# Patient Record
Sex: FEMALE | Race: WHITE | Employment: FULL TIME | URBAN - METROPOLITAN AREA
[De-identification: names, ages, dates, MRNs, and addresses within clinical notes are randomized per-mention and may not be internally consistent; named-entity substitution may affect disease eponyms.]

---

## 2019-10-09 ENCOUNTER — MED REC SCAN ONLY (OUTPATIENT)
Dept: INTERNAL MEDICINE CLINIC | Facility: CLINIC | Age: 54
End: 2019-10-09

## 2021-06-17 ENCOUNTER — OFFICE VISIT (OUTPATIENT)
Dept: INTERNAL MEDICINE CLINIC | Facility: CLINIC | Age: 56
End: 2021-06-17
Payer: COMMERCIAL

## 2021-06-17 VITALS
BODY MASS INDEX: 24.01 KG/M2 | SYSTOLIC BLOOD PRESSURE: 158 MMHG | DIASTOLIC BLOOD PRESSURE: 112 MMHG | HEART RATE: 75 BPM | OXYGEN SATURATION: 100 % | HEIGHT: 60 IN | RESPIRATION RATE: 14 BRPM | TEMPERATURE: 98 F | WEIGHT: 122.31 LBS

## 2021-06-17 DIAGNOSIS — Z00.00 ROUTINE GENERAL MEDICAL EXAMINATION AT A HEALTH CARE FACILITY: Primary | ICD-10-CM

## 2021-06-17 DIAGNOSIS — Z12.11 COLON CANCER SCREENING: ICD-10-CM

## 2021-06-17 DIAGNOSIS — Z12.31 BREAST CANCER SCREENING BY MAMMOGRAM: ICD-10-CM

## 2021-06-17 DIAGNOSIS — Z78.0 POSTMENOPAUSAL: ICD-10-CM

## 2021-06-17 DIAGNOSIS — I10 ESSENTIAL HYPERTENSION: ICD-10-CM

## 2021-06-17 DIAGNOSIS — Z12.4 CERVICAL CANCER SCREENING: ICD-10-CM

## 2021-06-17 PROCEDURE — 82270 OCCULT BLOOD FECES: CPT | Performed by: INTERNAL MEDICINE

## 2021-06-17 PROCEDURE — 99386 PREV VISIT NEW AGE 40-64: CPT | Performed by: INTERNAL MEDICINE

## 2021-06-17 PROCEDURE — 3080F DIAST BP >= 90 MM HG: CPT | Performed by: INTERNAL MEDICINE

## 2021-06-17 PROCEDURE — 3008F BODY MASS INDEX DOCD: CPT | Performed by: INTERNAL MEDICINE

## 2021-06-17 PROCEDURE — 87624 HPV HI-RISK TYP POOLED RSLT: CPT | Performed by: INTERNAL MEDICINE

## 2021-06-17 PROCEDURE — 82272 OCCULT BLD FECES 1-3 TESTS: CPT | Performed by: INTERNAL MEDICINE

## 2021-06-17 PROCEDURE — 88175 CYTOPATH C/V AUTO FLUID REDO: CPT | Performed by: INTERNAL MEDICINE

## 2021-06-17 PROCEDURE — 3077F SYST BP >= 140 MM HG: CPT | Performed by: INTERNAL MEDICINE

## 2021-06-17 RX ORDER — DILTIAZEM HYDROCHLORIDE 180 MG/1
180 CAPSULE, COATED, EXTENDED RELEASE ORAL DAILY
Qty: 30 CAPSULE | Refills: 0 | Status: SHIPPED | OUTPATIENT
Start: 2021-06-17 | End: 2021-08-05

## 2021-06-17 NOTE — PROGRESS NOTES
HPI:   Florian Paige is a 61year old female who presents for a complete physical exam. . Patient complains of long lapse in healthcare, virtually no preventive care, and recent BP elevation at dentist.   She thinks her BP is only high when she has a tough David Ott. Children: none , , .  She quit smoking this year     REVIEW OF SYSTEMS:     GENERAL denies night sweats, weight loss, rash, joint pain,   SKIN: denies any unusual skin lesions, no bothersome rashes  EYES:denies visual disturbances, or blurred vis percussion  CARDIO: RRR without murmur or gallop  GI: good BS's,no masses, HSM or tenderness  :introitus is normal,scant discharge,cervix is pink, stenotic,no adnexal masses or tenderness, no uterine enlargement or masses  MUSCULOSKELETAL: back is not te but you may have to wait. Thank you!       Meds & Refills for this Visit:  Requested Prescriptions     Signed Prescriptions Disp Refills   • dilTIAZem HCl ER Coated Beads (CARTIA XT) 180 MG Oral Capsule SR 24 Hr 30 capsule 0     Sig: Take 1 capsule (180

## 2021-06-17 NOTE — PATIENT INSTRUCTIONS
Please get your labs done. You should be fasting for at least 10 hours. You may drink water up until the time of your lab appointment.       If you take a multivitamin with Biotin or any biotin products it should be held for 3 days prior to getting your

## 2021-06-18 ENCOUNTER — LAB ENCOUNTER (OUTPATIENT)
Dept: LAB | Age: 56
End: 2021-06-18
Attending: INTERNAL MEDICINE
Payer: COMMERCIAL

## 2021-06-18 ENCOUNTER — EKG ENCOUNTER (OUTPATIENT)
Dept: LAB | Age: 56
End: 2021-06-18
Attending: INTERNAL MEDICINE
Payer: COMMERCIAL

## 2021-06-18 DIAGNOSIS — Z00.00 ROUTINE GENERAL MEDICAL EXAMINATION AT A HEALTH CARE FACILITY: ICD-10-CM

## 2021-06-18 PROCEDURE — 80053 COMPREHEN METABOLIC PANEL: CPT

## 2021-06-18 PROCEDURE — 93005 ELECTROCARDIOGRAM TRACING: CPT

## 2021-06-18 PROCEDURE — 81003 URINALYSIS AUTO W/O SCOPE: CPT

## 2021-06-18 PROCEDURE — 80061 LIPID PANEL: CPT

## 2021-06-18 PROCEDURE — 93010 ELECTROCARDIOGRAM REPORT: CPT | Performed by: INTERNAL MEDICINE

## 2021-06-18 PROCEDURE — 82746 ASSAY OF FOLIC ACID SERUM: CPT

## 2021-06-18 PROCEDURE — 36415 COLL VENOUS BLD VENIPUNCTURE: CPT

## 2021-06-18 PROCEDURE — 85025 COMPLETE CBC W/AUTO DIFF WBC: CPT

## 2021-06-18 PROCEDURE — 84443 ASSAY THYROID STIM HORMONE: CPT

## 2021-06-18 PROCEDURE — 82607 VITAMIN B-12: CPT

## 2021-06-23 ENCOUNTER — MED REC SCAN ONLY (OUTPATIENT)
Dept: INTERNAL MEDICINE CLINIC | Facility: CLINIC | Age: 56
End: 2021-06-23

## 2021-06-23 DIAGNOSIS — I10 ESSENTIAL HYPERTENSION: Primary | ICD-10-CM

## 2021-06-23 DIAGNOSIS — R94.31 ABNORMAL EKG: Primary | ICD-10-CM

## 2021-06-23 NOTE — PROGRESS NOTES
Spoke to pt. Made aware of results & recommendations. Pt voiced understanding.   Pt stated she has family history of low sodium with father and mother with liver failure  She will keep the recommendation in mind and will repeat labs in 1 month  She stated s

## 2021-06-23 NOTE — PROGRESS NOTES
LMTCB    Stress echo and CXR ordered per result note. Per [facility] practice Hard stop, Covid test was order for postprocedure.

## 2021-06-24 DIAGNOSIS — R94.31 ABNORMAL ECG: Primary | ICD-10-CM

## 2021-06-24 NOTE — PROGRESS NOTES
Spoke to pt, aware of results & recommendations. Pt refused to schedule tests at this time. Pt states, \"I know I have had a heart murmur since in my 20s. My dad has a heart murmur. I do know that I have an irregular heartbeat. \"  Pt states, \"I don't h

## 2021-07-06 ENCOUNTER — TELEPHONE (OUTPATIENT)
Dept: INTERNAL MEDICINE CLINIC | Facility: CLINIC | Age: 56
End: 2021-07-06

## 2021-07-06 NOTE — TELEPHONE ENCOUNTER
Dr Dominga Barnhart, please see note below from referral dept  Re: Stress ECHO ordered on 6/23/21 due to abnormal EKG    Please see EKG result note on 6/18/21  Pt does not want to do it  Pt has upcoming appt with you on 7/20/21  Please advise, thanks

## 2021-07-06 NOTE — TELEPHONE ENCOUNTER
Eusebio Knowles  [facility] Clinical Staff  Hello,     This referral has been Denied and will be closed as, Denied by Health Plan, pending provider decision for appeal.     Patient: Chema Cristina   Service Ref #: [id number]   Member ID: [id number]   Group #: [id number]

## 2021-07-12 ENCOUNTER — TELEPHONE (OUTPATIENT)
Dept: INTERNAL MEDICINE CLINIC | Facility: CLINIC | Age: 56
End: 2021-07-12

## 2021-07-12 NOTE — TELEPHONE ENCOUNTER
Left a message for patient to call back with an update if she would like for our office to Fax the Signed Cologuard Order from 6/17/2021 OV.   Patient was to check Coverage/Costs of Cologuard with Ins Co and let our office know to continue with order or not

## 2021-07-20 ENCOUNTER — OFFICE VISIT (OUTPATIENT)
Dept: INTERNAL MEDICINE CLINIC | Facility: CLINIC | Age: 56
End: 2021-07-20
Payer: COMMERCIAL

## 2021-07-20 VITALS
TEMPERATURE: 98 F | RESPIRATION RATE: 14 BRPM | HEART RATE: 76 BPM | SYSTOLIC BLOOD PRESSURE: 142 MMHG | HEIGHT: 60 IN | DIASTOLIC BLOOD PRESSURE: 88 MMHG | BODY MASS INDEX: 24.17 KG/M2 | WEIGHT: 123.13 LBS | OXYGEN SATURATION: 100 %

## 2021-07-20 DIAGNOSIS — R94.31 ABNORMAL ECG: ICD-10-CM

## 2021-07-20 DIAGNOSIS — Z12.11 COLON CANCER SCREENING: ICD-10-CM

## 2021-07-20 DIAGNOSIS — E87.1 HYPONATREMIA: ICD-10-CM

## 2021-07-20 DIAGNOSIS — I10 ESSENTIAL HYPERTENSION: Primary | ICD-10-CM

## 2021-07-20 DIAGNOSIS — D75.89 MACROCYTOSIS: ICD-10-CM

## 2021-07-20 PROCEDURE — 3079F DIAST BP 80-89 MM HG: CPT | Performed by: INTERNAL MEDICINE

## 2021-07-20 PROCEDURE — 99213 OFFICE O/P EST LOW 20 MIN: CPT | Performed by: INTERNAL MEDICINE

## 2021-07-20 PROCEDURE — 3077F SYST BP >= 140 MM HG: CPT | Performed by: INTERNAL MEDICINE

## 2021-07-20 PROCEDURE — 3008F BODY MASS INDEX DOCD: CPT | Performed by: INTERNAL MEDICINE

## 2021-07-20 RX ORDER — DILTIAZEM HYDROCHLORIDE 180 MG/1
180 CAPSULE, COATED, EXTENDED RELEASE ORAL DAILY
Qty: 90 CAPSULE | Refills: 1 | Status: CANCELLED | OUTPATIENT
Start: 2021-07-20

## 2021-07-20 RX ORDER — DILTIAZEM HYDROCHLORIDE 300 MG/1
300 CAPSULE, COATED, EXTENDED RELEASE ORAL DAILY
Qty: 90 CAPSULE | Refills: 1 | Status: SHIPPED | OUTPATIENT
Start: 2021-07-20 | End: 2021-08-05

## 2021-07-20 NOTE — PROGRESS NOTES
Radha Castano is a 61year old female.  To F/U from last visit regarding HTN and test results  HPI:    Interim history:she was here as new pt and found to be hypertensive  Taking medication  Checks her BP at work and normotensive, <130/80  Feels good- her s Temp 98.1 °F (36.7 °C) (Temporal)   Resp 14   Ht 5' (1.524 m)   Wt 123 lb 1.6 oz (55.8 kg)   LMP 12/31/2013   SpO2 100%   BMI 24.04 kg/m²   GENERAL: well developed, well nourished,in no apparent distress      Results for orders placed or performed in visit 24.0 06/18/2021         ASSESSMENT AND PLAN:   Essential hypertension  (primary encounter diagnosis) not well controlled , will increase dose to 300 mg, tolerating well  Macrocytosis- she is refusing further evaluation.  This is likely d/t alcohol effect, I

## 2021-08-03 ENCOUNTER — TELEPHONE (OUTPATIENT)
Dept: INTERNAL MEDICINE CLINIC | Facility: CLINIC | Age: 56
End: 2021-08-03

## 2021-08-03 DIAGNOSIS — I10 ESSENTIAL HYPERTENSION: Primary | ICD-10-CM

## 2021-08-03 NOTE — TELEPHONE ENCOUNTER
Spoke with pharmacist Yuridia  Pharmacist stated 300 mg dose is in capsule form  Called pt and left message to let nurse know how is she cutting capsule in half and it is not a safe practice    To send 180 mg dose instead if pt is cutting capsule in halfs

## 2021-08-03 NOTE — TELEPHONE ENCOUNTER
Pt called and stated that Dr Joselin Hill started Pt on dilTIAZem (CARTIA XT) 180MG. Dr Joselin Hill then changed Pt to dilTIAZem (CARTIA XT) 300 MG Oral Capsule SR 24 Hr.  Pt stated that she took the 300 for a few days and was having muscle aces and swelling in her legs

## 2021-08-04 NOTE — TELEPHONE ENCOUNTER
Left detailed message to [facility]

## 2021-08-05 RX ORDER — DILTIAZEM HYDROCHLORIDE 180 MG/1
180 CAPSULE, COATED, EXTENDED RELEASE ORAL DAILY
Qty: 90 CAPSULE | Refills: 0 | COMMUNITY
Start: 2022-01-20 | End: 2022-02-03

## 2021-08-05 RX ORDER — DILTIAZEM HYDROCHLORIDE 300 MG/1
CAPSULE, COATED, EXTENDED RELEASE ORAL
Qty: 90 CAPSULE | Refills: 0 | COMMUNITY
Start: 2021-07-20

## 2021-08-05 RX ORDER — DILTIAZEM HYDROCHLORIDE 300 MG/1
CAPSULE, COATED, EXTENDED RELEASE ORAL
Qty: 90 CAPSULE | Refills: 0 | COMMUNITY
Start: 2021-07-20 | End: 2021-08-05

## 2021-08-05 NOTE — TELEPHONE ENCOUNTER
Pt called back. States that she splits the capsules open & uses the smaller half of the casing to [facility] [place] as half\". Pt states that it was $60 for 90 capsules & that is really expensive for her.   States \"if she can split them in half & make a 6 month

## 2022-01-08 ENCOUNTER — TELEPHONE (OUTPATIENT)
Dept: INTERNAL MEDICINE CLINIC | Facility: CLINIC | Age: 57
End: 2022-01-08

## 2022-02-01 ENCOUNTER — TELEPHONE (OUTPATIENT)
Dept: INTERNAL MEDICINE CLINIC | Facility: CLINIC | Age: 57
End: 2022-02-01

## 2022-02-01 NOTE — TELEPHONE ENCOUNTER
Did the patient contact the pharmacy directly?: new pharmacy/[facility] on [address]    Is patient out of meds or supply very low?: pt has 5  Days left    Medication Requested: dilTIAZem HCl ER Coated Beads (CARTIA XT) 180 MG Oral Capsule SR 24 Hr    Dose:     Is patient requesting a 30 or 90 day supply?: 90    Pharmacy name and phone # or location:  [facility] [address]    Is the patient due for an appointment?: yes/pt is not able to afford to come in   (if so, please schedule appt)    Additional Notes: pt overdue for labs/states she can not get them done now due to cost

## 2022-02-03 RX ORDER — DILTIAZEM HYDROCHLORIDE 180 MG/1
180 CAPSULE, COATED, EXTENDED RELEASE ORAL DAILY
Qty: 30 CAPSULE | Refills: 0 | Status: SHIPPED | OUTPATIENT
Start: 2022-02-03 | End: 2022-03-05

## 2022-02-03 NOTE — TELEPHONE ENCOUNTER
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 Pt said its too expensive to come in for a BP check- does not feel its worth to come in.

## 2022-02-03 NOTE — TELEPHONE ENCOUNTER
LMOM to schedule BP and let pt know of katies note- see if pt is able Pt has appt 7/27/2017  No need for prior auth or predetermination.  May buy & bill Botox

## 2022-02-03 NOTE — TELEPHONE ENCOUNTER
I am not sure what this request means,. Is the patient stating she is not able to afford an apt at this time? Safety wise she is due for an apt and her BP and HR should be checked to confirm it is safe for her; Please have her schedule. 30 day supply sent. Thanks.

## 2022-02-07 DIAGNOSIS — I10 ESSENTIAL HYPERTENSION: ICD-10-CM

## 2022-02-08 RX ORDER — DILTIAZEM HYDROCHLORIDE 180 MG/1
180 CAPSULE, COATED, EXTENDED RELEASE ORAL DAILY
Qty: 30 CAPSULE | Refills: 0 | Status: CANCELLED | OUTPATIENT
Start: 2022-02-08 | End: 2022-03-10

## 2022-02-08 RX ORDER — DILTIAZEM HYDROCHLORIDE 300 MG/1
300 CAPSULE, COATED, EXTENDED RELEASE ORAL DAILY
Qty: 30 CAPSULE | Refills: 0 | Status: SHIPPED | OUTPATIENT
Start: 2022-02-08 | End: 2022-03-08

## 2022-02-08 RX ORDER — DILTIAZEM HYDROCHLORIDE 180 MG/1
CAPSULE, EXTENDED RELEASE ORAL
Qty: 30 CAPSULE | Refills: 0 | OUTPATIENT
Start: 2022-02-08

## 2022-02-08 NOTE — TELEPHONE ENCOUNTER
Hypertension Medications Protocol Failed 02/07/2022 02:41 PM   Protocol Details  Appointment in past 6 or next 3 months    CMP or BMP in past 12 months    Last serum creatinine< 2.0   RX has not been prescribed before   Last OV relevant to medication:7/20/2021  Last refill date: 7/20/2021 (dilTIAZEM (CARTIA XT) 300mg  #/refills: 90-0  When pt was asked to return for OV: 6 months (around 1/20/2022) for high blood pressure.   Upcoming appt/reason: n/a  Was pt informed of any over due labs:Yes  Lab Results   Component Value Date    GLU 87 06/18/2021    BUN 8 06/18/2021    BUNCREA 14.0 06/18/2021    CREATSERUM 0.57 06/18/2021    ANIONGAP 7 06/18/2021    GFRNAA 103 06/18/2021    GFRAA 119 06/18/2021    CA 8.8 06/18/2021    OSMOCALC 268 (L) 06/18/2021    ALKPHO 75 06/18/2021    AST 25 06/18/2021    ALT 29 06/18/2021    BILT 0.4 06/18/2021    TP 7.6 06/18/2021    ALB 3.9 06/18/2021    GLOBULIN 3.7 06/18/2021     (L) 06/18/2021    K 4.2 06/18/2021    CL 99 06/18/2021    CO2 24.0 06/18/2021

## 2022-02-08 NOTE — TELEPHONE ENCOUNTER
Per Dr. Tess Lambert last OV note 7/20/21 \"Essential hypertension  (primary encounter diagnosis) not well controlled , will increase dose to 300 mg, tolerating well\"    Patient should be on Diltiazem 300 mg PO every day. Please change to the correct dose with 30 day supply and inform patient. Please cancel the 180 mg dose. She should not be taking 180 mg capsules. Thanks!

## 2022-03-07 RX ORDER — DILTIAZEM HYDROCHLORIDE 180 MG/1
CAPSULE, EXTENDED RELEASE ORAL
Qty: 30 CAPSULE | Refills: 0 | OUTPATIENT
Start: 2022-03-07

## 2022-03-08 RX ORDER — DILTIAZEM HYDROCHLORIDE 300 MG/1
300 CAPSULE, COATED, EXTENDED RELEASE ORAL DAILY
Qty: 30 CAPSULE | Refills: 0 | Status: CANCELLED | OUTPATIENT
Start: 2022-03-08

## 2022-03-08 RX ORDER — DILTIAZEM HYDROCHLORIDE 300 MG/1
300 CAPSULE, COATED, EXTENDED RELEASE ORAL DAILY
Qty: 90 CAPSULE | Refills: 0 | Status: SHIPPED | OUTPATIENT
Start: 2022-03-08

## 2022-03-08 NOTE — TELEPHONE ENCOUNTER
Patient needs refill of 300mg dose. Last OV relevant to medication: 7/20/21  Last refill date: NEW DOSE    When pt was asked to return for OV: 6 months   Upcoming appt/reason: No future appointments. Was pt informed of any over due labs: n/a   Lab Results   Component Value Date    GLU 87 06/18/2021    BUN 8 06/18/2021    BUNCREA 14.0 06/18/2021    CREATSERUM 0.57 06/18/2021    ANIONGAP 7 06/18/2021    GFRNAA 103 06/18/2021    GFRAA 119 06/18/2021    CA 8.8 06/18/2021    OSMOCALC 268 (L) 06/18/2021    ALKPHO 75 06/18/2021    AST 25 06/18/2021    ALT 29 06/18/2021    BILT 0.4 06/18/2021    TP 7.6 06/18/2021    ALB 3.9 06/18/2021    GLOBULIN 3.7 06/18/2021     (L) 06/18/2021    K 4.2 06/18/2021    CL 99 06/18/2021    CO2 24.0 06/18/2021     Pended for review. Patient is almost out of medication.

## 2022-03-09 ENCOUNTER — TELEPHONE (OUTPATIENT)
Dept: INTERNAL MEDICINE CLINIC | Facility: CLINIC | Age: 57
End: 2022-03-09

## 2022-03-09 DIAGNOSIS — Z12.11 COLON CANCER SCREENING: Primary | ICD-10-CM

## 2022-07-18 ENCOUNTER — TELEPHONE (OUTPATIENT)
Dept: INTERNAL MEDICINE CLINIC | Facility: CLINIC | Age: 57
End: 2022-07-18

## 2022-07-27 ENCOUNTER — TELEPHONE (OUTPATIENT)
Dept: INTERNAL MEDICINE CLINIC | Facility: CLINIC | Age: 57
End: 2022-07-27

## 2022-09-17 ENCOUNTER — APPOINTMENT (OUTPATIENT)
Dept: CT IMAGING | Facility: HOSPITAL | Age: 57
End: 2022-09-17
Attending: EMERGENCY MEDICINE

## 2022-09-17 PROCEDURE — 72125 CT NECK SPINE W/O DYE: CPT | Performed by: EMERGENCY MEDICINE

## 2022-09-17 PROCEDURE — 70450 CT HEAD/BRAIN W/O DYE: CPT | Performed by: EMERGENCY MEDICINE

## 2022-09-17 NOTE — ED INITIAL ASSESSMENT (HPI)
Pt. Arrived via EMS due to a fall at home, per EMS pt called neighbor who called 911. Pt presents ETOH. With small laceration to the back of the head. Bleeding is controlled with applied gaze. Pt denies headache. Glucose of 74 by EMS.

## 2022-09-18 NOTE — ED NOTES
Patient was able to get a sober ride home. Patient's neighbor has come to the emergency room to pick the patient up and bring him home.   Patient is advised not to drink alcohol to excess

## 2022-09-18 NOTE — ED NOTES
Patient taken over from previous physician pending sobriety. Based on patient's alcohol level her EtOH will be below the legal limit at 5 AM.  At that time patient to be discharged home.   If she has a ride sooner that she may leave before then

## 2022-09-18 NOTE — ED QUICK NOTES
Pt self removed c-collar and sat up in bed.  Pt instructed to lay back down and c-collar reapplied and informed that ct needs to be resulted for c-collar to be removed by MD.

## 2022-09-19 ENCOUNTER — TELEPHONE (OUTPATIENT)
Dept: INTERNAL MEDICINE CLINIC | Facility: CLINIC | Age: 57
End: 2022-09-19

## 2022-09-19 PROBLEM — M47.812 CERVICAL SPONDYLOSIS: Status: ACTIVE | Noted: 2022-09-19

## 2022-09-19 NOTE — PATIENT INSTRUCTIONS
Get your labs done. You should be fasting for at least 10 hours. If you take a multivitamin with Biotin or any biotin product it should be held for 3 days prior to getting your labs done.

## 2022-09-19 NOTE — TELEPHONE ENCOUNTER
Per Patient's Request - Letter from Jeremias Bill today was Faxed to her Employer and original letter was given to Patient. Faxed to [phone], Attn: Jeana Bachelor    Confirmation Received.

## 2022-09-23 ENCOUNTER — OFFICE VISIT (OUTPATIENT)
Dept: INTERNAL MEDICINE CLINIC | Facility: CLINIC | Age: 57
End: 2022-09-23

## 2022-09-23 ENCOUNTER — LAB ENCOUNTER (OUTPATIENT)
Dept: LAB | Age: 57
End: 2022-09-23
Attending: NURSE PRACTITIONER

## 2022-09-23 VITALS
HEART RATE: 78 BPM | OXYGEN SATURATION: 98 % | TEMPERATURE: 97 F | WEIGHT: 120.5 LBS | BODY MASS INDEX: 23.05 KG/M2 | RESPIRATION RATE: 14 BRPM | HEIGHT: 60.75 IN | DIASTOLIC BLOOD PRESSURE: 86 MMHG | SYSTOLIC BLOOD PRESSURE: 132 MMHG

## 2022-09-23 DIAGNOSIS — Z00.00 ENCOUNTER FOR ANNUAL PHYSICAL EXAM: Primary | ICD-10-CM

## 2022-09-23 DIAGNOSIS — I10 ESSENTIAL HYPERTENSION: ICD-10-CM

## 2022-09-23 DIAGNOSIS — Z13.220 SCREENING FOR LIPID DISORDERS: ICD-10-CM

## 2022-09-23 DIAGNOSIS — Z12.31 SCREENING MAMMOGRAM FOR BREAST CANCER: ICD-10-CM

## 2022-09-23 DIAGNOSIS — Z00.00 ENCOUNTER FOR ANNUAL PHYSICAL EXAM: ICD-10-CM

## 2022-09-23 DIAGNOSIS — Z12.11 ENCOUNTER FOR SCREENING FECAL OCCULT BLOOD TESTING: ICD-10-CM

## 2022-09-23 DIAGNOSIS — E87.1 HYPONATREMIA: ICD-10-CM

## 2022-09-23 DIAGNOSIS — Z13.29 SCREENING FOR THYROID DISORDER: ICD-10-CM

## 2022-09-23 LAB
ANION GAP SERPL CALC-SCNC: 6 MMOL/L (ref 0–18)
BUN BLD-MCNC: 14 MG/DL (ref 7–18)
CALCIUM BLD-MCNC: 9.3 MG/DL (ref 8.5–10.1)
CHLORIDE SERPL-SCNC: 103 MMOL/L (ref 98–112)
CHOLEST SERPL-MCNC: 211 MG/DL (ref ?–200)
CO2 SERPL-SCNC: 23 MMOL/L (ref 21–32)
CREAT BLD-MCNC: 0.55 MG/DL
FASTING PATIENT LIPID ANSWER: YES
FASTING STATUS PATIENT QL REPORTED: YES
GFR SERPLBLD BASED ON 1.73 SQ M-ARVRAT: 106 ML/MIN/1.73M2 (ref 60–?)
GLUCOSE BLD-MCNC: 99 MG/DL (ref 70–99)
HDLC SERPL-MCNC: 93 MG/DL (ref 40–59)
LDLC SERPL CALC-MCNC: 107 MG/DL (ref ?–100)
NONHDLC SERPL-MCNC: 118 MG/DL (ref ?–130)
OSMOLALITY SERPL CALC.SUM OF ELEC: 275 MOSM/KG (ref 275–295)
POTASSIUM SERPL-SCNC: 4.4 MMOL/L (ref 3.5–5.1)
SODIUM SERPL-SCNC: 132 MMOL/L (ref 136–145)
TRIGL SERPL-MCNC: 61 MG/DL (ref 30–149)
TSI SER-ACNC: 1.47 MIU/ML (ref 0.36–3.74)
VLDLC SERPL CALC-MCNC: 10 MG/DL (ref 0–30)

## 2022-09-23 PROCEDURE — 84443 ASSAY THYROID STIM HORMONE: CPT

## 2022-09-23 PROCEDURE — 3008F BODY MASS INDEX DOCD: CPT | Performed by: NURSE PRACTITIONER

## 2022-09-23 PROCEDURE — 82270 OCCULT BLOOD FECES: CPT | Performed by: NURSE PRACTITIONER

## 2022-09-23 PROCEDURE — 80061 LIPID PANEL: CPT

## 2022-09-23 PROCEDURE — 3079F DIAST BP 80-89 MM HG: CPT | Performed by: NURSE PRACTITIONER

## 2022-09-23 PROCEDURE — 36415 COLL VENOUS BLD VENIPUNCTURE: CPT

## 2022-09-23 PROCEDURE — 3075F SYST BP GE 130 - 139MM HG: CPT | Performed by: NURSE PRACTITIONER

## 2022-09-23 PROCEDURE — 99396 PREV VISIT EST AGE 40-64: CPT | Performed by: NURSE PRACTITIONER

## 2022-09-23 PROCEDURE — 80048 BASIC METABOLIC PNL TOTAL CA: CPT

## 2022-09-23 PROCEDURE — 82272 OCCULT BLD FECES 1-3 TESTS: CPT | Performed by: NURSE PRACTITIONER

## 2022-09-23 RX ORDER — DILTIAZEM HYDROCHLORIDE 180 MG/1
180 CAPSULE, EXTENDED RELEASE ORAL DAILY
COMMUNITY
Start: 2022-09-19

## 2022-09-26 DIAGNOSIS — E87.1 HYPONATREMIA: Primary | ICD-10-CM

## 2022-09-27 DIAGNOSIS — I10 ESSENTIAL HYPERTENSION: ICD-10-CM

## 2022-09-29 RX ORDER — DILTIAZEM HYDROCHLORIDE 300 MG/1
CAPSULE, COATED, EXTENDED RELEASE ORAL
Qty: 30 CAPSULE | Refills: 0 | OUTPATIENT
Start: 2022-09-29

## 2023-03-10 DIAGNOSIS — I10 ESSENTIAL HYPERTENSION: ICD-10-CM

## 2023-03-13 RX ORDER — DILTIAZEM HYDROCHLORIDE 300 MG/1
CAPSULE, COATED, EXTENDED RELEASE ORAL
Qty: 30 CAPSULE | Refills: 0 | OUTPATIENT
Start: 2023-03-13

## 2023-09-09 DIAGNOSIS — I10 ESSENTIAL (PRIMARY) HYPERTENSION: ICD-10-CM

## 2023-09-11 RX ORDER — DILTIAZEM HYDROCHLORIDE 180 MG/1
180 CAPSULE, EXTENDED RELEASE ORAL DAILY
Qty: 90 CAPSULE | Refills: 0 | Status: SHIPPED | OUTPATIENT
Start: 2023-09-11

## 2023-09-11 NOTE — TELEPHONE ENCOUNTER
Hypertension Medications Protocol [id number]/09/2023 07:32 AM   Protocol Details CMP or BMP in past 12 months    Last serum creatinine< 2.0    Appointment in past 6 or next 3 months   2.  Essential hypertension  -Stable today, CPM  Future Appointments   Date Time Provider Queta Manzanares   9/29/2023  9:20 AM NESSA Ivan [facility] [address]

## 2023-09-29 ENCOUNTER — LAB ENCOUNTER (OUTPATIENT)
Dept: LAB | Age: 58
End: 2023-09-29
Attending: NURSE PRACTITIONER
Payer: COMMERCIAL

## 2023-09-29 ENCOUNTER — OFFICE VISIT (OUTPATIENT)
Dept: INTERNAL MEDICINE CLINIC | Facility: CLINIC | Age: 58
End: 2023-09-29
Payer: COMMERCIAL

## 2023-09-29 VITALS
WEIGHT: 117.19 LBS | TEMPERATURE: 98 F | DIASTOLIC BLOOD PRESSURE: 86 MMHG | BODY MASS INDEX: 22.71 KG/M2 | HEART RATE: 74 BPM | SYSTOLIC BLOOD PRESSURE: 138 MMHG | RESPIRATION RATE: 14 BRPM | HEIGHT: 60.39 IN | OXYGEN SATURATION: 98 %

## 2023-09-29 DIAGNOSIS — Z00.00 ENCOUNTER FOR ANNUAL PHYSICAL EXAM: ICD-10-CM

## 2023-09-29 DIAGNOSIS — F41.9 ANXIETY AND DEPRESSION: ICD-10-CM

## 2023-09-29 DIAGNOSIS — Z13.1 SCREENING FOR DIABETES MELLITUS: ICD-10-CM

## 2023-09-29 DIAGNOSIS — I10 ESSENTIAL HYPERTENSION: ICD-10-CM

## 2023-09-29 DIAGNOSIS — Z13.29 SCREENING FOR THYROID DISORDER: ICD-10-CM

## 2023-09-29 DIAGNOSIS — Z13.0 SCREENING FOR DEFICIENCY ANEMIA: ICD-10-CM

## 2023-09-29 DIAGNOSIS — Z13.0 SCREENING FOR ENDOCRINE, METABOLIC AND IMMUNITY DISORDER: ICD-10-CM

## 2023-09-29 DIAGNOSIS — Z23 NEED FOR VACCINATION: ICD-10-CM

## 2023-09-29 DIAGNOSIS — Z13.220 SCREENING FOR LIPID DISORDERS: ICD-10-CM

## 2023-09-29 DIAGNOSIS — Z13.29 SCREENING FOR ENDOCRINE, METABOLIC AND IMMUNITY DISORDER: ICD-10-CM

## 2023-09-29 DIAGNOSIS — Z12.11 ENCOUNTER FOR SCREENING FECAL OCCULT BLOOD TESTING: ICD-10-CM

## 2023-09-29 DIAGNOSIS — F32.A ANXIETY AND DEPRESSION: ICD-10-CM

## 2023-09-29 DIAGNOSIS — Z13.228 SCREENING FOR ENDOCRINE, METABOLIC AND IMMUNITY DISORDER: ICD-10-CM

## 2023-09-29 DIAGNOSIS — Z00.00 ENCOUNTER FOR ANNUAL PHYSICAL EXAM: Primary | ICD-10-CM

## 2023-09-29 DIAGNOSIS — M54.41 CHRONIC BILATERAL LOW BACK PAIN WITH RIGHT-SIDED SCIATICA: ICD-10-CM

## 2023-09-29 DIAGNOSIS — G89.29 CHRONIC BILATERAL LOW BACK PAIN WITH RIGHT-SIDED SCIATICA: ICD-10-CM

## 2023-09-29 LAB
ALBUMIN SERPL-MCNC: 4.1 G/DL (ref 3.4–5)
ALBUMIN/GLOB SERPL: 1.1 {RATIO} (ref 1–2)
ALP LIVER SERPL-CCNC: 76 U/L
ALT SERPL-CCNC: 27 U/L
ANION GAP SERPL CALC-SCNC: 10 MMOL/L (ref 0–18)
AST SERPL-CCNC: 18 U/L (ref 15–37)
BASOPHILS # BLD AUTO: 0.02 X10(3) UL (ref 0–0.2)
BASOPHILS NFR BLD AUTO: 0.3 %
BILIRUB SERPL-MCNC: 0.4 MG/DL (ref 0.1–2)
BUN BLD-MCNC: 9 MG/DL (ref 7–18)
CALCIUM BLD-MCNC: 8.9 MG/DL (ref 8.5–10.1)
CHLORIDE SERPL-SCNC: 97 MMOL/L (ref 98–112)
CHOLEST SERPL-MCNC: 215 MG/DL (ref ?–200)
CO2 SERPL-SCNC: 23 MMOL/L (ref 21–32)
CREAT BLD-MCNC: 0.53 MG/DL
EGFRCR SERPLBLD CKD-EPI 2021: 106 ML/MIN/1.73M2 (ref 60–?)
EOSINOPHIL # BLD AUTO: 0.07 X10(3) UL (ref 0–0.7)
EOSINOPHIL NFR BLD AUTO: 1.2 %
ERYTHROCYTE [DISTWIDTH] IN BLOOD BY AUTOMATED COUNT: 12.9 %
EST. AVERAGE GLUCOSE BLD GHB EST-MCNC: 111 MG/DL (ref 68–126)
FASTING PATIENT LIPID ANSWER: YES
FASTING STATUS PATIENT QL REPORTED: YES
GLOBULIN PLAS-MCNC: 3.9 G/DL (ref 2.8–4.4)
GLUCOSE BLD-MCNC: 83 MG/DL (ref 70–99)
HBA1C MFR BLD: 5.5 % (ref ?–5.7)
HCT VFR BLD AUTO: 39.5 %
HDLC SERPL-MCNC: 105 MG/DL (ref 40–59)
HGB BLD-MCNC: 13.4 G/DL
IMM GRANULOCYTES # BLD AUTO: 0.06 X10(3) UL (ref 0–1)
IMM GRANULOCYTES NFR BLD: 1 %
LDLC SERPL CALC-MCNC: 98 MG/DL (ref ?–100)
LYMPHOCYTES # BLD AUTO: 1.3 X10(3) UL (ref 1–4)
LYMPHOCYTES NFR BLD AUTO: 22.4 %
MCH RBC QN AUTO: 36 PG (ref 26–34)
MCHC RBC AUTO-ENTMCNC: 33.9 G/DL (ref 31–37)
MCV RBC AUTO: 106.2 FL
MONOCYTES # BLD AUTO: 0.63 X10(3) UL (ref 0.1–1)
MONOCYTES NFR BLD AUTO: 10.8 %
NEUTROPHILS # BLD AUTO: 3.73 X10 (3) UL (ref 1.5–7.7)
NEUTROPHILS # BLD AUTO: 3.73 X10(3) UL (ref 1.5–7.7)
NEUTROPHILS NFR BLD AUTO: 64.3 %
NONHDLC SERPL-MCNC: 110 MG/DL (ref ?–130)
OSMOLALITY SERPL CALC.SUM OF ELEC: 268 MOSM/KG (ref 275–295)
PLATELET # BLD AUTO: 383 10(3)UL (ref 150–450)
POTASSIUM SERPL-SCNC: 4.1 MMOL/L (ref 3.5–5.1)
PROT SERPL-MCNC: 8 G/DL (ref 6.4–8.2)
RBC # BLD AUTO: 3.72 X10(6)UL
SODIUM SERPL-SCNC: 130 MMOL/L (ref 136–145)
TRIGL SERPL-MCNC: 67 MG/DL (ref 30–149)
TSI SER-ACNC: 1.33 MIU/ML (ref 0.36–3.74)
VLDLC SERPL CALC-MCNC: 11 MG/DL (ref 0–30)
WBC # BLD AUTO: 5.8 X10(3) UL (ref 4–11)

## 2023-09-29 PROCEDURE — 84443 ASSAY THYROID STIM HORMONE: CPT

## 2023-09-29 PROCEDURE — 3079F DIAST BP 80-89 MM HG: CPT | Performed by: NURSE PRACTITIONER

## 2023-09-29 PROCEDURE — 36415 COLL VENOUS BLD VENIPUNCTURE: CPT

## 2023-09-29 PROCEDURE — 80061 LIPID PANEL: CPT

## 2023-09-29 PROCEDURE — 3008F BODY MASS INDEX DOCD: CPT | Performed by: NURSE PRACTITIONER

## 2023-09-29 PROCEDURE — 99214 OFFICE O/P EST MOD 30 MIN: CPT | Performed by: NURSE PRACTITIONER

## 2023-09-29 PROCEDURE — 83036 HEMOGLOBIN GLYCOSYLATED A1C: CPT

## 2023-09-29 PROCEDURE — 3075F SYST BP GE 130 - 139MM HG: CPT | Performed by: NURSE PRACTITIONER

## 2023-09-29 PROCEDURE — 99396 PREV VISIT EST AGE 40-64: CPT | Performed by: NURSE PRACTITIONER

## 2023-09-29 PROCEDURE — 82272 OCCULT BLD FECES 1-3 TESTS: CPT | Performed by: NURSE PRACTITIONER

## 2023-09-29 PROCEDURE — 85025 COMPLETE CBC W/AUTO DIFF WBC: CPT

## 2023-09-29 PROCEDURE — 80053 COMPREHEN METABOLIC PANEL: CPT

## 2023-09-29 RX ORDER — ELECTROLYTES/DEXTROSE
1 SOLUTION, ORAL ORAL DAILY
COMMUNITY

## 2023-09-29 RX ORDER — GABAPENTIN 300 MG/1
300 CAPSULE ORAL NIGHTLY PRN
Qty: 90 CAPSULE | Refills: 0 | Status: SHIPPED | OUTPATIENT
Start: 2023-09-29

## 2023-09-29 NOTE — PATIENT INSTRUCTIONS
Get your labs done. You should be fasting for at least 10 hours. If you take a multivitamin with Biotin or any biotin product it should be held for 3 days prior to getting your labs done. If you use [facility] labs, you may schedule at [phone] or on-line at [facility].     Start the gabapentin 300 mg nightly as needed.

## 2023-10-03 ENCOUNTER — TELEPHONE (OUTPATIENT)
Dept: INTERNAL MEDICINE CLINIC | Facility: CLINIC | Age: 58
End: 2023-10-03

## 2023-10-03 NOTE — TELEPHONE ENCOUNTER
Spoke to patient regarding results. Patient endorses drinking about 8 glass of wine per week. States \"rodriguez it down\". Patient endorses each glass is 1/2 cup of wine and half water each time. Patient states this would equate to 4 glasses of wine per week. Informed patient she should stop altogether. Patient concerned about fluid restriction and increase in salt intake. Patient endorses drinking 33 oz of water first thing in morning with pinch of [address] salt as recommended by provider previously. 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 Walks min of 6.5 miles per day. Sweats a lot. Lots of lifting and bending. Has to give patients showers which causes her to sweat a lot. States drinks a lot of water because dehydrated with all of this. Also has to take care of spouse at home who is in wheelchair after a stroke. Patient states in passed she passed out due to sodium deficiency. Patient concerned as restriction would only allow her about 17 oz after having 33 oz in the morning. Patient asking if there is something she can add to water instead of restricting. Please advise- thanks!

## 2023-10-04 NOTE — TELEPHONE ENCOUNTER
Drinking more water would decrease her sodium even more. She should follow my original instructions and repeat labs in 1 month as ordered. If sodium is persistently low, she would need to see renal. Thanks.

## 2023-10-04 NOTE — TELEPHONE ENCOUNTER
Should patient increase Himalayan salt to half a teaspoon in the morning instead of a \"pinch\"? Please advise- thanks!

## 2023-10-05 NOTE — TELEPHONE ENCOUNTER
Rec call from patient. Patient aware of providers response and rec's. Patient verbalizes understanding of results. Patient states unable to complete testing in month if she has to pay for it. States she called her insurance and they said tests have to be ordered as \"cpt=routine exam or general exam\" to be covered.

## 2023-10-06 NOTE — TELEPHONE ENCOUNTER
Spoke to pt, advised labs were originally ordered for that code but we cannot order subsequent labs for a routine exam as these are not routine labs for a routine exam. Advised CPT codes are for imaging, provided icd-10 codes for labs and advised to check with insurance if these labs are covered with these codes. Pt advised Jeremias hammond highly recommends getting these drawn and pt verbalized understanding.

## 2023-12-01 ENCOUNTER — TELEPHONE (OUTPATIENT)
Dept: INTERNAL MEDICINE CLINIC | Facility: CLINIC | Age: 58
End: 2023-12-01

## 2023-12-01 DIAGNOSIS — M54.41 CHRONIC BILATERAL LOW BACK PAIN WITH RIGHT-SIDED SCIATICA: Primary | ICD-10-CM

## 2023-12-01 DIAGNOSIS — G89.29 CHRONIC BILATERAL LOW BACK PAIN WITH RIGHT-SIDED SCIATICA: Primary | ICD-10-CM

## 2023-12-01 NOTE — TELEPHONE ENCOUNTER
Patient would like Physical Therapy orders sent to:      DX: Hip/back/leg    [facility]  [phone] Fax    Patient states that she recently saw [facility] for Physical and said she would call back with information once she talk to the insurance.

## 2023-12-07 DIAGNOSIS — I10 ESSENTIAL (PRIMARY) HYPERTENSION: ICD-10-CM

## 2023-12-07 RX ORDER — DILTIAZEM HYDROCHLORIDE 180 MG/1
180 CAPSULE, EXTENDED RELEASE ORAL DAILY
Qty: 90 CAPSULE | Refills: 0 | Status: SHIPPED | OUTPATIENT
Start: 2023-12-07

## 2024-03-06 DIAGNOSIS — I10 ESSENTIAL (PRIMARY) HYPERTENSION: ICD-10-CM

## 2024-03-06 RX ORDER — DILTIAZEM HYDROCHLORIDE 180 MG/1
180 CAPSULE, EXTENDED RELEASE ORAL DAILY
Qty: 90 CAPSULE | Refills: 0 | Status: SHIPPED | OUTPATIENT
Start: 2024-03-06

## 2024-03-06 NOTE — TELEPHONE ENCOUNTER
Hypertension Medications Protocol [id number]/06/2024 12:20 AM   Protocol Details CMP or BMP in past 12 months    Last BP reading less than 140/90    In person appointment or virtual visit in the past 12 mos or appointment in next 3 mos    EGFRCR or GFRNAA > 50      3. Essential hypertension  -BP slightly elevated initially, recheck lower  -Continue diltiazem   -Low salt diet and monitor BP at home. Notify me if persistently >140/90  No future appointments.

## 2024-06-01 DIAGNOSIS — I10 ESSENTIAL (PRIMARY) HYPERTENSION: ICD-10-CM

## 2024-06-03 RX ORDER — DILTIAZEM HYDROCHLORIDE 180 MG/1
180 CAPSULE, EXTENDED RELEASE ORAL DAILY
Qty: 90 CAPSULE | Refills: 1 | Status: SHIPPED | OUTPATIENT
Start: 2024-06-03

## 2024-06-03 NOTE — TELEPHONE ENCOUNTER
Hypertension Medications Protocol [id number]/01/2024 07:18 AM   Protocol Details CMP or BMP in past 12 months    Last BP reading less than 140/90    In person appointment or virtual visit in the past 12 mos or appointment in next 3 mos    EGFRCR or GFRNAA > 50   Essential hypertension  -BP slightly elevated initially, recheck lower  -Continue diltiazem

## 2024-08-28 ENCOUNTER — HOSPITAL ENCOUNTER (OUTPATIENT)
Dept: GENERAL RADIOLOGY | Age: 59
Discharge: HOME | End: 2024-08-28
Attending: NURSE PRACTITIONER
Payer: COMMERCIAL

## 2024-08-28 ENCOUNTER — OFFICE VISIT (OUTPATIENT)
Dept: INTERNAL MEDICINE CLINIC | Facility: CLINIC | Age: 59
End: 2024-08-28
Payer: COMMERCIAL

## 2024-08-28 VITALS
RESPIRATION RATE: 16 BRPM | BODY MASS INDEX: 22.25 KG/M2 | TEMPERATURE: 98 F | HEIGHT: 60.39 IN | WEIGHT: 114.81 LBS | OXYGEN SATURATION: 98 % | HEART RATE: 91 BPM | DIASTOLIC BLOOD PRESSURE: 100 MMHG | SYSTOLIC BLOOD PRESSURE: 164 MMHG

## 2024-08-28 DIAGNOSIS — G89.29 CHRONIC BILATERAL LOW BACK PAIN WITH BILATERAL SCIATICA: Primary | ICD-10-CM

## 2024-08-28 DIAGNOSIS — M54.41 CHRONIC BILATERAL LOW BACK PAIN WITH BILATERAL SCIATICA: Primary | ICD-10-CM

## 2024-08-28 DIAGNOSIS — I10 ESSENTIAL (PRIMARY) HYPERTENSION: ICD-10-CM

## 2024-08-28 DIAGNOSIS — M54.42 CHRONIC BILATERAL LOW BACK PAIN WITH BILATERAL SCIATICA: Primary | ICD-10-CM

## 2024-08-28 DIAGNOSIS — G89.29 CHRONIC BILATERAL LOW BACK PAIN WITH BILATERAL SCIATICA: ICD-10-CM

## 2024-08-28 DIAGNOSIS — M54.41 CHRONIC BILATERAL LOW BACK PAIN WITH BILATERAL SCIATICA: ICD-10-CM

## 2024-08-28 DIAGNOSIS — M54.42 CHRONIC BILATERAL LOW BACK PAIN WITH BILATERAL SCIATICA: ICD-10-CM

## 2024-08-28 PROCEDURE — G2211 COMPLEX E/M VISIT ADD ON: HCPCS | Performed by: NURSE PRACTITIONER

## 2024-08-28 PROCEDURE — 72110 X-RAY EXAM L-2 SPINE 4/>VWS: CPT | Performed by: NURSE PRACTITIONER

## 2024-08-28 PROCEDURE — 99214 OFFICE O/P EST MOD 30 MIN: CPT | Performed by: NURSE PRACTITIONER

## 2024-08-28 RX ORDER — TRAMADOL HYDROCHLORIDE 50 MG/1
50 TABLET ORAL EVERY 6 HOURS PRN
COMMUNITY

## 2024-08-28 RX ORDER — PENICILLIN V POTASSIUM 500 MG/1
500 TABLET, FILM COATED ORAL 2 TIMES DAILY
COMMUNITY
Start: 2024-08-26

## 2024-08-28 RX ORDER — HYDROCODONE BITARTRATE AND ACETAMINOPHEN 5; 325 MG/1; MG/1
1 TABLET ORAL EVERY 4 HOURS PRN
COMMUNITY
Start: 2024-07-23

## 2024-08-28 RX ORDER — METHYLPREDNISOLONE 4 MG
TABLET, DOSE PACK ORAL
Qty: 1 EACH | Refills: 0 | Status: SHIPPED | OUTPATIENT
Start: 2024-08-28

## 2024-08-28 RX ORDER — DILTIAZEM HYDROCHLORIDE 300 MG/1
300 CAPSULE, EXTENDED RELEASE ORAL DAILY
Qty: 90 CAPSULE | Refills: 0 | Status: SHIPPED | OUTPATIENT
Start: 2024-08-28

## 2024-08-28 NOTE — PATIENT INSTRUCTIONS
Get the x-ray done today.    Start the steroid pack. Take it with food. Do not lay down for 1 hour after taking it. Monitor for side effects including stomach pain, acid reflux, trouble sleeping, and anxiety.     Do not take Ibuprofen while taking steroid pack. You can take acetaminophen if needed.     Start physical therapy.    Alternate between ice/heat therapy.     Increase diltiazem to 300 mg daily.    Monitor blood pressure daily at home and record in log. Bring log to next visit.

## 2024-08-28 NOTE — PROGRESS NOTES
CHIEF COMPLAINT:     Chief Complaint   Patient presents with    Low Back Pain     Started a couple weeks ago - started on Right Side and within the last week the pain has transferred to the Left Side - Left Leg in Numb, can tell she's touching it but still numb feeling, pain will still radiate to right side off and on, but pain does radiate down legs on either side       HPI:   Sarita Ren is a 59 year old female coming in with worsening back pain from the past 2 weeks. 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     Has history of chronic low back pain which is manageable with tylenol. However, she did had a fall about 3 weeks ago at work where she fell on the left side. She did not hit her head or LOC. She did not have any pain until about 1 week afterwards. Pain started off to the right low back but now worse to the left back with radiation to the left leg and sometimes right. Has numbness and tingling to the BLE (L>R). Pain level is 10/10 with exertion. Denies any loss of B/B control. She had a tooth removed and has some norco left and taking that with some relief. Does have old prescription for tramadol as well but that does not help. Have not taken the gabapentin in a while.     HTN: BP is elevated. Reports it is around 150/90s at home as well. She attributes it to the pain. Denies any headaches, chest pain, shortness of breath, or palpitations.     Past Medical History:    Cephalgia    viral    Chronic diarrhea    Complex ovarian cyst    Left    Costochondral chest pain    R tenderness    Lower back pain    Left-constant    Recurrent sinusitis      Past Surgical History:   Procedure Laterality Date    Appendectomy      Other surgical history      elective       Social History:  Social History     Socioeconomic History    Marital status:    Occupational History    Occupation: teacher   Tobacco Use    Smoking status: Former      Current packs/day: 0.50     Average packs/day: 0.5 packs/day for 20.0 years (10.0 ttl pk-yrs)     Types: Cigarettes     Passive exposure: Never    Smokeless tobacco: Never   Vaping Use    Vaping status: Never Used   Substance and Sexual Activity    Alcohol use: Yes     Alcohol/week: 6.0 standard drinks of alcohol     Types: 6 Glasses of wine per week    Drug use: No   Other Topics Concern    Caffeine Concern Yes     Comment: 3 cups of coffee or iced tea weekly    Hobby Hazards No    Sleep Concern Yes    Stress Concern Yes    Weight Concern No    Special Diet No    Back Care Yes    Exercise Yes     Comment: daily    Seat Belt Yes    Self-Exams Yes      Family History:  Family History   Problem Relation Age of Onset    Kidney Disease Mother     Heart Disease Mother     Dementia Mother     Hypertension Father     Dementia Father     Other (Other) Father       Allergies:  No Known Allergies   Current Meds:  Current Outpatient Medications   Medication Sig Dispense Refill    HYDROcodone-acetaminophen 5-325 MG Oral Tab Take 1 tablet by mouth every 4 (four) hours as needed for Pain.      penicillin v potassium 500 MG Oral Tab Take 1 tablet (500 mg total) by mouth in the morning and 1 tablet (500 mg total) before bedtime.      traMADol 50 MG Oral Tab Take 1 tablet (50 mg total) by mouth every 6 (six) hours as needed for Pain.      dilTIAZem HCl ER Beads (TIADYLT ER) 300 MG Oral Capsule SR 24 Hr Take 1 capsule (300 mg total) by mouth daily. 90 capsule 0    methylPREDNISolone (MEDROL) 4 MG Oral Tablet Therapy Pack As directed. 1 each 0    MULTIVITAMIN ADULT Oral Tab Take 1 tablet by mouth daily.      COLLAGEN OR Take 1 Dose by mouth daily.      gabapentin 300 MG Oral Cap Take 1 capsule (300 mg total) by mouth nightly as needed. 90 capsule 0    POTASSIUM OR Take 1 tablet by mouth daily.      NON FORMULARY BACK AID MAX      aspirin 81 MG Oral Tab Take 1 tablet (81 mg total) by mouth daily.      Aspirin-Acetaminophen-Caffeine  (EXCEDRIN OR) Take 1-2 tablets by mouth as needed.      Glucosamine HCl 1000 MG Oral Tab Take 1-2 capsules by mouth daily.         Counseling given: Not Answered       REVIEW OF SYSTEMS:   See HPI.    EXAM:     BP (!) 164/100   Pulse 91   Temp 97.8 °F (36.6 °C) (Temporal)   Resp 16   Ht 5' 0.39\" (1.534 m)   Wt 114 lb 12.8 oz (52.1 kg)   LMP 12/31/2013   SpO2 98%   BMI 22.13 kg/m²   Body mass index is 22.13 kg/m².   Vital signs reviewed. Appears stated age, well groomed, in no acute distress.  Physical Exam:  GENERAL: Patient is alert, awake and oriented, well developed, well nourished.  HEENT: Head: Normocephalic, atraumatic.   HEART: RRR without murmur.  LUNGS: Clear to auscultation bilaterally, no rales/rhonchi/wheezing.  ABDOMEN: good BS's, no masses, HSM or tenderness  MUSCULOSKELETAL: There is TTP to the lumbar paraspinal tissues. SLR positive bilaterally, L>R   EXTREMITIES: No edema, no cyanosis, no clubbing, FROM  NEURO: Oriented time three. Antalgic gait, uses cane for ambulation. Strength is 5/5 to BLE.    LABS:      Lab Results   Component Value Date    WBC 5.8 09/29/2023    RBC 3.72 (L) 09/29/2023    HGB 13.4 09/29/2023    HCT 39.5 09/29/2023    .2 (H) 09/29/2023    MCH 36.0 (H) 09/29/2023    MCHC 33.9 09/29/2023    RDW 12.9 09/29/2023    .0 09/29/2023      Lab Results   Component Value Date    GLU 83 09/29/2023    BUN 9 09/29/2023    BUNCREA 14.0 06/18/2021    CREATSERUM 0.53 (L) 09/29/2023    ANIONGAP 10 09/29/2023    GFRNAA 103 06/18/2021    GFRAA 119 06/18/2021    CA 8.9 09/29/2023    OSMOCALC 268 (L) 09/29/2023    ALKPHO 76 09/29/2023    AST 18 09/29/2023    ALT 27 09/29/2023    BILT 0.4 09/29/2023    TP 8.0 09/29/2023    ALB 4.1 09/29/2023    GLOBULIN 3.9 09/29/2023     (L) 09/29/2023    K 4.1 09/29/2023    CL 97 (L) 09/29/2023    CO2 23.0 09/29/2023      Lab Results   Component Value Date    CHOLEST 215 (H) 09/29/2023    TRIG 67 09/29/2023     (H) 09/29/2023     LDL 98 09/29/2023    VLDL 11 09/29/2023    NONHDLC 110 09/29/2023      Lab Results   Component Value Date    TSH 1.330 09/29/2023      Lab Results   Component Value Date     09/29/2023    A1C 5.5 09/29/2023        IMAGING:     No results found.     ASSESSMENT AND PLAN:   1. Chronic bilateral low back pain with bilateral sciatica  -Work excuse letter given to be off for about 2 weeks  -Get lumbar spine x-ray  -Start medrol pack, SE discussed  -Continue tylenol prn  -Can take gabapentin as needed  -Start PT  - Physical Therapy Referral - [facility]  - methylPREDNISolone (MEDROL) 4 MG Oral Tablet Therapy Pack; As directed.  Dispense: 1 each; Refill: 0  - XR LUMBAR SPINE (MIN 4 VIEWS) (CPT=72110); Future    2. Essential (primary) hypertension  -BP elevated  -Increase diltiazem to 300 mg daily  -Monitor BP at home and record in log. Bring log to next visit.   - dilTIAZem HCl ER Beads (TIADYLT ER) 300 MG Oral Capsule SR 24 Hr; Take 1 capsule (300 mg total) by mouth daily.  Dispense: 90 capsule; Refill: 0     The patient indicates understanding of these issues and agrees to the plan.  Return in about 5 weeks (around 10/2/2024) for physical, bp check.    Bhumika Jones, APRN  8/28/2024

## 2024-08-29 ENCOUNTER — OFFICE VISIT (OUTPATIENT)
Dept: PHYSICAL THERAPY | Facility: HOSPITAL | Age: 59
End: 2024-08-29
Attending: NURSE PRACTITIONER
Payer: COMMERCIAL

## 2024-08-29 ENCOUNTER — TELEPHONE (OUTPATIENT)
Dept: PHYSICAL THERAPY | Facility: HOSPITAL | Age: 59
End: 2024-08-29

## 2024-08-29 DIAGNOSIS — M54.41 CHRONIC BILATERAL LOW BACK PAIN WITH BILATERAL SCIATICA: Primary | ICD-10-CM

## 2024-08-29 DIAGNOSIS — M54.42 CHRONIC BILATERAL LOW BACK PAIN WITH BILATERAL SCIATICA: Primary | ICD-10-CM

## 2024-08-29 DIAGNOSIS — G89.29 CHRONIC BILATERAL LOW BACK PAIN WITH BILATERAL SCIATICA: Primary | ICD-10-CM

## 2024-08-29 PROCEDURE — 97110 THERAPEUTIC EXERCISES: CPT

## 2024-08-29 PROCEDURE — 97162 PT EVAL MOD COMPLEX 30 MIN: CPT

## 2024-08-29 NOTE — PROGRESS NOTES
SPINE EVALUATION:     Diagnosis:   Chronic bilateral low back pain with bilateral sciatica (M54.42,M54.41,G89.29)      Referring Provider: Bhumika Jones  Date of Evaluation:    8/29/2024    Precautions:  None Next MD visit:   none scheduled  Date of Surgery: n/a     PATIENT SUMMARY   Sarita Ren is a 59 year old female who presents to therapy today with complaints of low back pain. Pt reports she has had right sided low back pain for a while and has had sciatica for a while. Two weeks ago it started having left sided that was very severe. 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 Pt has been working at this job for 9 years. 3 years ago she noticed on the L hip it will crack a lot. Leg feels numb on the L thigh. Pt did have x-ray's done at doctor, findings below. Also recent blood pressure medication. Pt did call doctor about the changing thigh numbness today. Pt did try to do PT for this before at her work, but they stopped this. She also has to care for her  who is in a wheelchair. Pt is taking 2 weeks off of work because of the pain. Since Febuary has been getting worse. Symptoms in R leg shoot down into calf. L leg only symptoms in thigh.     X-Ray Findings:  There is severe apex right lumbar rotoscoliosis. There is straightening of the expected lordosis.  There is a few mm retrolisthesis of L1 on L2 and a few mm anterolisthesis of L3 on L4.  Approximately 20-25% anterolisthesis of L4 on L5. There is severe L4-L5 and L5-S1 facet arthropathy. Scattered up to moderate to severe disc degenerative changes, most pronounced at L1-L2, L3-L4 and L4-L5. Vertebral body heights are within the limits of normal. There is mild to moderate bilateral sacroiliac joint arthritis.     Pt describes pain level current 7/10, at best 4/10, at worst \"20/10\" (considered going to ER, but did not)    Aggravating: bending, walking, upright  positions  Relieving: heat, medrol dose pack  Nature: \"electric\", stabbing, pressure  24-Hour Pattern: feels stiff in the morning, pain progresses throughout day  How long do symptoms last? Pretty much has constant symptoms.   Current functional limitations include off work right now due to pain.     Sarita describes prior level of function has had some right sided back pain off and on for years, but never this bag. Pt goals include improve pain.  Past medical history was reviewed with Sarita. Significant findings include  has a past medical history of Cephalgia (2/21/2005), Chronic diarrhea, Complex ovarian cyst, Costochondral chest pain (3/24/2010), Lower back pain (9/22/2004), and Recurrent sinusitis.   Pt denies diplopia, dysarthria, dysphasia, dizziness, drop attacks, bowel/bladder changes, saddle anesthesia, and JAMESON LE N/T.    ASSESSMENT  Sarita presents to physical therapy evaluation with primary c/o low back pain. The results of the objective tests and measures show painful lumbar AROM, absent L patellar reflex, pain to palpation of entire lumbar spine, varied weakness in BLE, negative slump test, WFL hamstring length.  Functional deficits include but are not limited to unable to work right now due to pain.  Signs and symptoms are consistent with diagnosis of low back pain with bilateral radicular symptoms. Pt and PT discussed evaluation findings, pathology, POC and HEP.  Pt voiced understanding and performs HEP correctly without reported pain. Skilled Physical Therapy is medically necessary to address the above impairments and reach functional goals.     OBJECTIVE:   Observation/Posture: unremarkable  Neuro Screen: diminished sensation on L thigh, vibratory sense intact  Vitals: 160/90 mmHg (saw MD yesterday for this and placed on increased dose of medication)    L patellar reflex absent, R 2+    Lumbar AROM: (* denotes performed with pain)  Flexion: 75 deg  Extension: 5 deg*  Sidebending: R WFL*; L  WFL*  Rotation: R WFL*; L WFL*    Accessory motion:   Central PA: Pain throughout lumbar spine and sacral spine  UPA: more pain on L vs right    Palpation: increased tension in paraspinal muscles in upper lumbar spine.     Hip AROM  L Hip ER: 30 deg*  L Hip IR: 25 deg*  R hip ER: 30 deg   R Hip IR: 40 deg    Strength: (* denotes performed with pain)  LE   Hip flexion (L2): R 5/5; L 4+/5  Hip ER: R 4+/5; L 4+/5  Hip IR: R 4+/5; L 3+/5  Knee Flexion: R 5/5; L 5/5   Knee extension (L3): R 5/5; L 5/5   DF (L4): R 5/5; L 5/5  Great Toe Ext (L5): R WFL/5, L WFL/5  PF (S1): R 5/5; L 5/5 (screened sitting)        Special tests:   Slump: negative bilat  SLR: L 90 deg, R 75 deg    Today’s Treatment and Response:   Pt education was provided on exam findings, treatment diagnosis, treatment plan, expectations, and prognosis. Pt was also provided recommendations for activity modifications, possible soreness after evaluation, modalities as needed [ice/heat], and importance of remaining active  Patient was instructed in and issued a HEP for:   Access Code: [email or web address]  URL: [email or web address]  Date: 08/29/2024  Prepared by: Norma Lange    Exercises  - Supine Lower Trunk Rotation  - 1 x daily - 7 x weekly - 2 sets - 10 reps  - Hooklying Single Knee to Chest Stretch  - 1 x daily - 7 x weekly - 2 sets - 10 reps    Charges: PT Eval Moderate Complexity, TE 1      Total Timed Treatment: 10 min     Total Treatment Time: 45 min   TE  - Assigned HEP above and reviewed on how to complete  - Eduction on use of heat vs ice     Based on clinical rationale and outcome measures, this evaluation involved Moderate Complexity decision making due to 1-2 personal factors/comorbidities, 3 body structures involved/activity limitations, and evolving symptoms including changing pain levels.  PLAN OF CARE:    Goals: (to be met in 10 visits)   - Pt will report daily pain level at no higher than 5/10 to improve ability to complete ADL's  without pain.  - Pt will have centralization of distal symptoms to back to reduce radicular pain.  - Pt will be able to complete proper TA contraction to improve core strength for decreased back pain.  - Pt will be independent with HEP to maintain gains made in therapy.     Frequency / Duration: Patient will be seen for 2 x/week or a total of 10 visits over a 90 day period. Treatment will include: Gait training, Manual Therapy, Mechanical Traction, Neuromuscular Re-education, Therapeutic Activities, Therapeutic Exercise, Home Exercise Program instruction, and Modalities to include: Electrical stimulation (unattended)    Education or treatment limitation: None  Rehab Potential:fair    Patient/Family/Caregiver was advised of these findings, precautions, and treatment options and has agreed to actively participate in planning and for this course of care.    Thank you for your referral. Please co-sign or sign and return this letter via fax as soon as possible to [phone]. If you have any questions, please contact me at Dept: [phone]    Sincerely,  Electronically signed by therapist: Norma Lange PT, DPT    Physician's certification required: Yes  I certify the need for these services furnished under this plan of treatment and while under my care.    X___________________________________________________ Date____________________    Certification From: 8/29/2024  To:11/27/2024

## 2024-09-04 ENCOUNTER — OFFICE VISIT (OUTPATIENT)
Dept: PHYSICAL THERAPY | Facility: HOSPITAL | Age: 59
End: 2024-09-04
Attending: NURSE PRACTITIONER
Payer: COMMERCIAL

## 2024-09-04 ENCOUNTER — TELEPHONE (OUTPATIENT)
Dept: INTERNAL MEDICINE CLINIC | Facility: CLINIC | Age: 59
End: 2024-09-04

## 2024-09-04 DIAGNOSIS — G89.29 CHRONIC BILATERAL LOW BACK PAIN WITH BILATERAL SCIATICA: Primary | ICD-10-CM

## 2024-09-04 DIAGNOSIS — M54.41 CHRONIC BILATERAL LOW BACK PAIN WITH BILATERAL SCIATICA: Primary | ICD-10-CM

## 2024-09-04 DIAGNOSIS — M54.42 CHRONIC BILATERAL LOW BACK PAIN WITH BILATERAL SCIATICA: Primary | ICD-10-CM

## 2024-09-04 PROCEDURE — 97110 THERAPEUTIC EXERCISES: CPT

## 2024-09-04 PROCEDURE — 97140 MANUAL THERAPY 1/> REGIONS: CPT

## 2024-09-04 NOTE — TELEPHONE ENCOUNTER
Patient said the numbness in her left leg has not improved since her appointment on 8/28/24 with Bhumika.  Her leg is still numb from her hip to knee.  She has had her initial evaluation with physical therapy who said they were going to call the office.  She is wondering if Bhumika would order an MRI.  Please advise.  Thank you!

## 2024-09-04 NOTE — TELEPHONE ENCOUNTER
PT notes in epic, please see update- pt wondering if she can do MRI for further eval. Please advise, thanks!

## 2024-09-04 NOTE — PROGRESS NOTES
Diagnosis:   Chronic bilateral low back pain with bilateral sciatica (M54.42,M54.41,G89.29)         Referring Provider: Bhumika Jones  Date of Evaluation:    8/29/2024    Precautions:  None Next MD visit:   none scheduled  Date of Surgery: n/a   Insurance Primary/Secondary: [employer] [facility] / N/A     # Auth Visits: 10            Subjective: Pt reports that yesterday was last day of the steroid pack. She felt pretty sore after the evaluation last week. Pt also reports a fall when helping her  shower over the weekend, she is okay but some bruising on the hip. Did start taking increased BP med dose today.     Pain: 7/10      Objective:   9/4/2024  - /82 at end of session    Lumbar AROM: (* denotes performed with pain)  Flexion: 75 deg  Extension: 5 deg*  Sidebending: R WFL*; L WFL*  Rotation: R WFL*; L WFL*     Accessory motion:   Central PA: Pain throughout lumbar spine and sacral spine  UPA: more pain on L vs right     Palpation: increased tension in paraspinal muscles in upper lumbar spine.      Hip AROM  L Hip ER: 30 deg*  L Hip IR: 25 deg*  R hip ER: 30 deg   R Hip IR: 40 deg     Strength: (* denotes performed with pain)  LE   Hip flexion (L2): R 5/5; L 4+/5  Hip ER: R 4+/5; L 4+/5  Hip IR: R 4+/5; L 3+/5  Knee Flexion: R 5/5; L 5/5            Knee extension (L3): R 5/5; L 5/5            DF (L4): R 5/5; L 5/5  Great Toe Ext (L5): R WFL/5, L WFL/5  PF (S1): R 5/5; L 5/5 (screened sitting)          Special tests:   Slump: negative bilat  SLR: L 90 deg, R 75 deg      Assessment: Pt attends first follow up after intial evaluation. Pt reports that LTR exercise at home has been aggravating symptoms a little bit. Focused intervention on decreasing erector spinae muscle tension, noted increased tension on R vs L. Pt finds some relief with long axis distraction in the back, completed bilaterally for pain relief. Initiated general strengthening activities, pt able to complete with come increases in back pain  on the left. Pt concerned about high pain levels and possible return to work next week, educated pt on continued rest and reaching out to doctor about concerns.       Goals:   to be met in 10 visits)   - Pt will report daily pain level at no higher than 5/10 to improve ability to complete ADL's without pain.  - Pt will have centralization of distal symptoms to back to reduce radicular pain.  - Pt will be able to complete proper TA contraction to improve core strength for decreased back pain.  - Pt will be independent with HEP to maintain gains made in therapy.     Plan: Continue per plan of care.   Date: 9/4/2024  TX#: 2/10 Date:                 TX#: 3/ Date:                 TX#: 4/ Date:                 TX#: 5/ Date:   Tx#: 6/   Therex: 25 min  Bridges, 2x10  Hooklying clamshell, yellow band, 3x10   Stir the pot with resistance, x10 R/L   Standing row with green band, 3x10   Shuttle press, #37, 2x10          Manual: 20 min  STM to erector spinae muscles bilat  Long axis distraction R/L, multiple bouts each        Hot pack, 8 min               HEP:   Access Code: [email or web address]  URL: [email or web address]  Date: 08/29/2024  Prepared by: Norma Lange     Exercises  - Supine Lower Trunk Rotation  - 1 x daily - 7 x weekly - 2 sets - 10 reps  - Hooklying Single Knee to Chest Stretch  - 1 x daily - 7 x weekly - 2 sets - 10 reps    Charges: TE 2 Manual 1       Total Timed Treatment: 45 min  Total Treatment Time: 53 min

## 2024-09-06 ENCOUNTER — TELEPHONE (OUTPATIENT)
Dept: PHYSICAL THERAPY | Facility: HOSPITAL | Age: 59
End: 2024-09-06

## 2024-09-06 ENCOUNTER — APPOINTMENT (OUTPATIENT)
Dept: PHYSICAL THERAPY | Facility: HOSPITAL | Age: 59
End: 2024-09-06
Attending: NURSE PRACTITIONER
Payer: COMMERCIAL

## 2024-09-09 ENCOUNTER — OFFICE VISIT (OUTPATIENT)
Dept: PHYSICAL THERAPY | Facility: HOSPITAL | Age: 59
End: 2024-09-09
Attending: NURSE PRACTITIONER
Payer: COMMERCIAL

## 2024-09-09 PROCEDURE — 97110 THERAPEUTIC EXERCISES: CPT | Performed by: PHYSICAL THERAPY ASSISTANT

## 2024-09-09 PROCEDURE — 97140 MANUAL THERAPY 1/> REGIONS: CPT | Performed by: PHYSICAL THERAPY ASSISTANT

## 2024-09-09 NOTE — PROGRESS NOTES
Diagnosis:   Chronic bilateral low back pain with bilateral sciatica (M54.42,M54.41,G89.29)         Referring Provider: Bhumika Jones  Date of Evaluation:    8/29/2024    Precautions:  None Next MD visit:   none scheduled  Date of Surgery: n/a   Insurance Primary/Secondary: [employer] [facility] / N/A     # Auth Visits: 10            Subjective: Pt reports steroid pack and felt much better after that. Had electric shock feelings previously which have now diminished. Left side is now painful too and that is worse than right. After last session I felt it - but nothing was worse than before. I have MRI on Wednesday. Pt expressed concerns over financial impact of current level of medical interventions and work related absences.   Pain: 7/10 - consistent    Objective:  Able to achieve symptom free status with manual distraction.      Lumbar AROM: (* denotes performed with pain)  Flexion: 75 deg  Extension: 5 deg*  Sidebending: R WFL*; L WFL*  Rotation: R WFL*; L WFL*     Accessory motion:   Central PA: Pain throughout lumbar spine and sacral spine  UPA: more pain on L vs right     Palpation: increased tension in paraspinal muscles in upper lumbar spine.      Hip AROM  L Hip ER: 30 deg*  L Hip IR: 25 deg*  R hip ER: 30 deg   R Hip IR: 40 deg     Strength: (* denotes performed with pain)  LE   Hip flexion (L2): R 5/5; L 4+/5  Hip ER: R 4+/5; L 4+/5  Hip IR: R 4+/5; L 3+/5  Knee Flexion: R 5/5; L 5/5            Knee extension (L3): R 5/5; L 5/5            DF (L4): R 5/5; L 5/5  Great Toe Ext (L5): R WFL/5, L WFL/5  PF (S1): R 5/5; L 5/5 (screened sitting)          Special tests:   Slump: negative bilat  SLR: L 90 deg, R 75 deg      Assessment:  Focused intervention on decreasing erector spinae muscle tension and decreasing pain levels.  Pt reports continued relief with long axis distraction in the back.  Introduced new activities with no adverse reaction. Pt reports increased numbness in L LE to knee - this has been  consistent since 2 weeks ago. Continued general strengthening activities. Pt remains concerned about high pain levels and possible return to work next week, educated pt on continued rest and reaching out to doctor about concerns. Discussed requesting an ortho MD appointment for further evaluation and to address POC. Pt reported no adverse reaction in response to interventions today. PT remains necessary to address ongoing deficits as identified at initial assessment.      Goals:   to be met in 10 visits)   - Pt will report daily pain level at no higher than 5/10 to improve ability to complete ADL's without pain.  - Pt will have centralization of distal symptoms to back to reduce radicular pain.  - Pt will be able to complete proper TA contraction to improve core strength for decreased back pain.  - Pt will be independent with HEP to maintain gains made in therapy.     Plan: Continue per plan of care.   Date: 9/4/2024  TX#: 2/10 Date:             9/9/2024     TX#: 3/ 10 Date:                 TX#: 4/ Date:                 TX#: 5/ Date:   Tx#: 6/   Therex: 25 min  Bridges, 2x10  Hooklying clamshell, yellow band, 3x10   Stir the pot with resistance, x10 R/L   Standing row with green band, 3x10   Shuttle press, #37, 2x10    Therex: 25 min  DKTC x 20 w RSB   Bridges, 2x10  Supine heel slides x 20 B   Hooklying clamshell, yellow band, 3x10   Stir the pot with resistance, x10 R/L  NT  Standing row with green band, 3x10 NT  Shuttle press, #37, 2x10  NT  Resisted walkout w BTB         Manual: 20 min  STM to erector spinae muscles bilat  Long axis distraction R/L, multiple bouts each  Manual: 20 min  STM to erector spinae muscles bilat  Long axis distraction over RSB 4 x 45 secs       Hot pack, 8 min               HEP:   Access Code: [email or web address]  URL: [email or web address]  Date: 08/29/2024  Prepared by: Norma Lange     Exercises  - Supine Lower Trunk Rotation  - 1 x daily - 7 x weekly - 2 sets - 10 reps  -  Hooklying Single Knee to Chest Stretch  - 1 x daily - 7 x weekly - 2 sets - 10 reps    Charges: TE 2 Manual 1       Total Timed Treatment: 45 min  Total Treatment Time: 53 min

## 2024-09-10 ENCOUNTER — TELEPHONE (OUTPATIENT)
Dept: INTERNAL MEDICINE CLINIC | Facility: CLINIC | Age: 59
End: 2024-09-10

## 2024-09-10 NOTE — TELEPHONE ENCOUNTER
Letter written  and faxed   Patient notified. Patient verbalized understanding   Letter at the  for pt to .

## 2024-09-10 NOTE — TELEPHONE ENCOUNTER
Bhumika wrote a note for patient to return to work on 9/15/24 following a back injury.  She has been going to physical therapy and the therapist said she is not ready to return to her duties at [employer].  The physical therapy suggests her leave of absence be extended to 10/13/24.  Her MRI is scheduled for 9/11/24.   Would Bhumika please write a letter to extend her leave of absence to 10/13/24?  This letter needs to be faxed to [facility] in [place] at [phone], attn Carolyne Zavala and Maral Morse.  Please fax by Friday, 9/13/24 since the current date is 9/15/24.

## 2024-09-11 ENCOUNTER — HOSPITAL ENCOUNTER (OUTPATIENT)
Dept: MRI IMAGING | Age: 59
Discharge: HOME | End: 2024-09-11
Attending: NURSE PRACTITIONER
Payer: COMMERCIAL

## 2024-09-11 ENCOUNTER — OFFICE VISIT (OUTPATIENT)
Dept: PHYSICAL THERAPY | Facility: HOSPITAL | Age: 59
End: 2024-09-11
Attending: NURSE PRACTITIONER
Payer: COMMERCIAL

## 2024-09-11 DIAGNOSIS — M54.42 CHRONIC BILATERAL LOW BACK PAIN WITH BILATERAL SCIATICA: ICD-10-CM

## 2024-09-11 DIAGNOSIS — M54.41 CHRONIC BILATERAL LOW BACK PAIN WITH BILATERAL SCIATICA: ICD-10-CM

## 2024-09-11 DIAGNOSIS — G89.29 CHRONIC BILATERAL LOW BACK PAIN WITH BILATERAL SCIATICA: ICD-10-CM

## 2024-09-11 PROCEDURE — 97140 MANUAL THERAPY 1/> REGIONS: CPT | Performed by: PHYSICAL THERAPY ASSISTANT

## 2024-09-11 PROCEDURE — 97110 THERAPEUTIC EXERCISES: CPT | Performed by: PHYSICAL THERAPY ASSISTANT

## 2024-09-11 PROCEDURE — 72148 MRI LUMBAR SPINE W/O DYE: CPT | Performed by: NURSE PRACTITIONER

## 2024-09-11 NOTE — PROGRESS NOTES
Diagnosis:   Chronic bilateral low back pain with bilateral sciatica (M54.42,M54.41,G89.29)         Referring Provider: Bhumika Jones  Date of Evaluation:    8/29/2024    Precautions:  None Next MD visit:   none scheduled  Date of Surgery: n/a   Insurance Primary/Secondary: [employer] [facility] / N/A     # Auth Visits: 10            Subjective: The side stepping from previous session was too much - pain did not subside till following day - I can still feel it. Left side is feeling worst today. Relief felt from manual distraction only lasted whilst on table. Managed to get an extension for time off work. [employer] wipers most painful thing for HEP. Increased numbness in L leg - was unable to tell if she was drying herself with a towel.   Pain: 8/10     Objective:  Able to achieve symptom free status with manual distraction.      Lumbar AROM: (* denotes performed with pain)  Flexion: 75 deg  Extension: 5 deg*  Sidebending: R WFL*; L WFL*  Rotation: R WFL*; L WFL*     Accessory motion:   Central PA: Pain throughout lumbar spine and sacral spine  UPA: more pain on L vs right     Palpation: increased tension in paraspinal muscles in upper lumbar spine.      Hip AROM  L Hip ER: 30 deg*  L Hip IR: 25 deg*  R hip ER: 30 deg   R Hip IR: 40 deg     Strength: (* denotes performed with pain)  LE   Hip flexion (L2): R 5/5; L 4+/5  Hip ER: R 4+/5; L 4+/5  Hip IR: R 4+/5; L 3+/5  Knee Flexion: R 5/5; L 5/5            Knee extension (L3): R 5/5; L 5/5            DF (L4): R 5/5; L 5/5  Great Toe Ext (L5): R WFL/5, L WFL/5  PF (S1): R 5/5; L 5/5 (screened sitting)          Special tests:   Slump: negative bilat  SLR: L 90 deg, R 75 deg      Assessment:  Pt presents with increased pain and attributes this to the side stepping from previous session. Again focused intervention on decreasing erector spinae muscle tension and decreasing pain levels. Pt will have MRI this pm and wishes to set up [facility] to access results.  Would also like  referral to orthopedic MD to assist with further evaluation of current condition which she feels is deteriorating. Reports increased numbness in L LE on both lateral and medial thigh. Was unable to perceive whether she was drying herself with a towel. Pt has obtained extension to time off work. Message sent to referring provider to request ortho follow up. Conservative approach to session today as pt in 8/10 pain. Pain not declined by end of session. Pt reported no adverse reaction in response to interventions today. PT remains necessary to address ongoing deficits as identified at initial assessment.        Goals:   to be met in 10 visits)   - Pt will report daily pain level at no higher than 5/10 to improve ability to complete ADL's without pain.  - Pt will have centralization of distal symptoms to back to reduce radicular pain.  - Pt will be able to complete proper TA contraction to improve core strength for decreased back pain.  - Pt will be independent with HEP to maintain gains made in therapy.     Plan: Review MRI results and follow up on MD referral - continue with neutral spine core stability work  Date: 9/4/2024  TX#: 2/10 Date:             9/9/2024     TX#: 3/ 10 Date:      9/11/2024            TX#: 4/ 10 Date:                 TX#: 5/ Date:   Tx#: 6/   Therex: 25 min  Bridges, 2x10  Hooklying clamshell, yellow band, 3x10   Stir the pot with resistance, x10 R/L   Standing row with green band, 3x10   Shuttle press, #37, 2x10    Therex: 25 min  DKTC x 20 w RSB   Bridges, 2x10  Supine heel slides x 20 B   Hooklying clamshell, yellow band, 3x10   Stir the pot with resistance, x10 R/L  NT  Standing row with green band, 3x10 NT  Shuttle press, #37, 2x10  NT  Resisted walkout w BTB    Ther ex 25 mins     NuStep 5 mins  DKTC x 20 w RSB   Supine marching x 20   Bridges - ceased at 5 due to pain   Pull downs with GTB x 20   Addressed pt concerns regarding MRI / ortho referral and further evaluation for condition  which is deteriorating.      Manual: 20 min  STM to erector spinae muscles bilat  Long axis distraction R/L, multiple bouts each  Manual: 20 min  STM to erector spinae muscles bilat  Long axis distraction over RSB 4 x 45 secs  Manual  10 mins   Long axis distraction over RSB - multiple bouts 45 secs     Hot pack, 8 min               HEP:   Access Code: [email or web address]  URL: [email or web address]  Date: 08/29/2024  Prepared by: Norma Lange     Exercises  - Supine Lower Trunk Rotation  - 1 x daily - 7 x weekly - 2 sets - 10 reps  - Hooklying Single Knee to Chest Stretch  - 1 x daily - 7 x weekly - 2 sets - 10 reps    Charges: TE 2 Manual 1       Total Timed Treatment: 40 min  Total Treatment Time: 40 min

## 2024-09-12 ENCOUNTER — TELEPHONE (OUTPATIENT)
Dept: ORTHOPEDICS CLINIC | Facility: CLINIC | Age: 59
End: 2024-09-12

## 2024-09-12 NOTE — TELEPHONE ENCOUNTER
Future Appointments   Date Time Provider Department Center   9/16/2024  9:00 AM Jose Angel Darnell MD [facility]   9/18/2024  7:00 AM Jennifer Farris PTA  PHYS  [facility]   9/25/2024  7:45 AM Jennifer Farris PTA  PHYS  [facility]   9/27/2024 10:45 AM Norma Lange [facility]   10/11/2024 10:45 AM Nazario Norma [facility] PHYS  [facility]     Please advise if pt needs lower back xrays.

## 2024-09-13 ENCOUNTER — TELEPHONE (OUTPATIENT)
Dept: INTERNAL MEDICINE CLINIC | Facility: CLINIC | Age: 59
End: 2024-09-13

## 2024-09-13 NOTE — TELEPHONE ENCOUNTER
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 According to patient has to be filled out today as it is the corporate deadline.     Placed in provider bin for completion and sig. Attach office note 8/28, xray, mri? Thanks!

## 2024-09-13 NOTE — TELEPHONE ENCOUNTER
Faxed to [facility] benefits at [phone] with office visit note. Rec confirmation. Patient aware. Original placed up front for patient to . Copy sent for scanning. Copy held in triage.

## 2024-09-13 NOTE — TELEPHONE ENCOUNTER
Called patient. Left detailed message regarding below. Okay per marina.  Advised to call back for any questions or concerns. Mcm also sent.

## 2024-09-13 NOTE — TELEPHONE ENCOUNTER
I am in between seeing patients. Please give her our form time line policy. I cannot promise it will be done today. It will be for sure done by Monday. If I finish it today, I will let you know. Thanks

## 2024-09-16 ENCOUNTER — OFFICE VISIT (OUTPATIENT)
Dept: ORTHOPEDICS CLINIC | Facility: CLINIC | Age: 59
End: 2024-09-16
Payer: COMMERCIAL

## 2024-09-16 DIAGNOSIS — M41.50 DEGENERATIVE SCOLIOSIS: Primary | ICD-10-CM

## 2024-09-16 PROCEDURE — 99205 OFFICE O/P NEW HI 60 MIN: CPT | Performed by: STUDENT IN AN ORGANIZED HEALTH CARE EDUCATION/TRAINING PROGRAM

## 2024-09-16 RX ORDER — GABAPENTIN 100 MG/1
100 CAPSULE ORAL 3 TIMES DAILY
Qty: 60 CAPSULE | Refills: 0 | Status: SHIPPED | OUTPATIENT
Start: 2024-09-16

## 2024-09-16 RX ORDER — TRAMADOL HYDROCHLORIDE 50 MG/1
50 TABLET ORAL EVERY 6 HOURS PRN
Qty: 20 TABLET | Refills: 0 | Status: SHIPPED | OUTPATIENT
Start: 2024-09-16

## 2024-09-16 NOTE — H&P
[facility] - ORTHOPEDICS  [address], [address] [address]  [address] [place] [id number]  [phone]     NEW PATIENT VISIT - HISTORY AND PHYSICAL EXAMINATION     Name: Sarita Ren   MRN: [id number]  Date: 24       CC: Back and leg pain    REFERRED BY: Carine Erickson MD    HPI:   Sarita Ren is a very pleasant 59 year old female who presents today for evaluation of back and leg pain. The distribution of symptoms are: 20% backpain and 80% leg pain. The symptoms began many year(s) ago without any significant injury. Since the onset, the symptoms have rapidly deteriorated . Patient feels pain is aggravated by walking, standing and improved by rest. The patient reports  numbness and  weakness.  The symptom characteristics are as follows: Patient is a 59-year-old female with longstanding low back pain initially radiating to the right lower extremity, more recently started radiating into the left lower extremity, associated with numbness and weakness.  Patient has been taking gabapentin, tramadol for pain with improvement in symptoms.  Patient has been on leave from work due to pain and unable to walk long distances..     Prior spine surgery: none.    Bowel and bladder symptoms: absent.    The patient has not had issues with balance and/or hand dexterity problems such as changes in penmanship or the use of buttons or zippers.    Treatment up to this time has included:    Evaluation: PCP  NSAIDS: have worked well  Narcotic use: None  Physical therapy: Ongoing  Spinal injections: None  Others:       PMH:   Past Medical History:    Cephalgia    viral    Chronic diarrhea    Complex ovarian cyst    Left    Costochondral chest pain    R tenderness    Lower back pain    Left-constant    Recurrent sinusitis       PAST SURGICAL HX:  Past Surgical History:   Procedure Laterality Date    Appendectomy      Other surgical history      elective        FAMILY  HX:  Family History   Problem Relation Age of Onset    Kidney Disease Mother     Heart Disease Mother     Dementia Mother     Hypertension Father     Dementia Father     Other (Other) Father        ALLERGIES:  Patient has no known allergies.    MEDICATIONS:   Current Outpatient Medications   Medication Sig Dispense Refill    HYDROcodone-acetaminophen 5-325 MG Oral Tab Take 1 tablet by mouth every 4 (four) hours as needed for Pain.      penicillin v potassium 500 MG Oral Tab Take 1 tablet (500 mg total) by mouth in the morning and 1 tablet (500 mg total) before bedtime.      traMADol 50 MG Oral Tab Take 1 tablet (50 mg total) by mouth every 6 (six) hours as needed for Pain.      dilTIAZem HCl ER Beads (TIADYLT ER) 300 MG Oral Capsule SR 24 Hr Take 1 capsule (300 mg total) by mouth daily. 90 capsule 0    methylPREDNISolone (MEDROL) 4 MG Oral Tablet Therapy Pack As directed. 1 each 0    MULTIVITAMIN ADULT Oral Tab Take 1 tablet by mouth daily.      COLLAGEN OR Take 1 Dose by mouth daily.      gabapentin 300 MG Oral Cap Take 1 capsule (300 mg total) by mouth nightly as needed. 90 capsule 0    POTASSIUM OR Take 1 tablet by mouth daily.      NON FORMULARY BACK AID MAX      aspirin 81 MG Oral Tab Take 1 tablet (81 mg total) by mouth daily.      Aspirin-Acetaminophen-Caffeine (EXCEDRIN OR) Take 1-2 tablets by mouth as needed.      Glucosamine HCl 1000 MG Oral Tab Take 1-2 capsules by mouth daily.         ROS: A comprehensive 14 point review of systems was performed and was negative aside from the aforementioned per history of present illness.    SOCIAL HX:  Social History     Tobacco Use    Smoking status: Former     Current packs/day: 0.50     Average packs/day: 0.5 packs/day for 20.0 years (10.0 ttl pk-yrs)     Types: Cigarettes     Passive exposure: Never    Smokeless tobacco: Never   Substance Use Topics    Alcohol use: Yes     Alcohol/week: 6.0 standard drinks of alcohol     Types: 6 Glasses of wine per week       PE:    There were no vitals filed for this visit.  Estimated body mass index is 22.13 kg/m² as calculated from the following:    Height as of 8/28/24: 5' 0.39\" (1.534 m).    Weight as of 8/28/24: 114 lb 12.8 oz (52.1 kg).    Physical Exam  Constitutional:       Appearance: Normal appearance.   HENT:      Head: Normocephalic and atraumatic.   Eyes:      Extraocular Movements: Extraocular movements intact.   Cardiovascular:      Pulses: Normal pulses. Skin warm and well perfused.  Pulmonary:      Effort: Pulmonary effort is normal. No respiratory distress.   Skin:     General: Skin is warm.   Psychiatric:         Mood and Affect: Mood normal.     Spine Exam:    Normal gait without difficulty  Able to heel, toe, tandem gait without difficulty  Level shoulders and hips in even stance    Restricted L-spine ROM    No tenderness to palpation of L-spine    Straight leg raise test: negative    Sustained clonus: negative    LE Strength: 5/5 IP QUAD TA EHL GSC  LE Sensation: normal in L2-S1 distribution  LE reflexes: normal    Radiographic Examination/Diagnostics:  XR and MRI personally viewed, independently interpreted and radiology report was reviewed.  X-ray of the lumbar spine demonstrates severe degenerative changes with degenerative scoliosis, with spondylolisthesis L4-5  MRI of the lumbar spine demonstrated severe spinal stenosis at multiple levels from L2-S1    IMPRESSION: Sarita Ren is a 59 year old female with severe degenerative scoliosis, L4-5 spondylolisthesis, and severe spinal stenosis    PLAN:     We reviewed the patients history, symptoms, exam findings, and imaging today.  We had a detailed discussion outlining the etiology, anatomy, pathophysiology, and natural history of spinal stenosis. The typical management of this condition may include lifestyle modification, NSAIDs, physical therapy, oral steroids, epidural injections, neuromodulatory medications, and sometimes pain medications.      I discussed in  detail with patient surgical options.  Due to patient's deformity as well as severe spinal stenosis, patient will likely require long fusion construct from likely T12 to pelvis.  I discussed with patient that this type of surgery may have prolonged recovery as well as high complication profile.    Renewed tramadol and Gabapentin on short term basis    Patient can call to schedule      Jose Angel Darnell MD  Orthopedic Spine Surgeon  [facility]   [address] [place] [address].[email or web address]  t: [phone]   f: [phone]        This note was dictated using Dragon software.  While it was briefly proofread prior to completion, some grammatical, spelling, and word choice errors due to dictation may still occur.

## 2024-09-18 ENCOUNTER — OFFICE VISIT (OUTPATIENT)
Dept: PHYSICAL THERAPY | Facility: HOSPITAL | Age: 59
End: 2024-09-18
Attending: NURSE PRACTITIONER
Payer: COMMERCIAL

## 2024-09-18 ENCOUNTER — OFFICE VISIT (OUTPATIENT)
Dept: INTERNAL MEDICINE CLINIC | Facility: CLINIC | Age: 59
End: 2024-09-18
Payer: COMMERCIAL

## 2024-09-18 VITALS
SYSTOLIC BLOOD PRESSURE: 150 MMHG | WEIGHT: 114 LBS | HEIGHT: 60.39 IN | RESPIRATION RATE: 16 BRPM | BODY MASS INDEX: 22.09 KG/M2 | HEART RATE: 82 BPM | TEMPERATURE: 98 F | OXYGEN SATURATION: 97 % | DIASTOLIC BLOOD PRESSURE: 86 MMHG

## 2024-09-18 DIAGNOSIS — G89.29 CHRONIC BILATERAL LOW BACK PAIN WITH BILATERAL SCIATICA: ICD-10-CM

## 2024-09-18 DIAGNOSIS — M54.41 CHRONIC BILATERAL LOW BACK PAIN WITH BILATERAL SCIATICA: ICD-10-CM

## 2024-09-18 DIAGNOSIS — M54.42 CHRONIC BILATERAL LOW BACK PAIN WITH BILATERAL SCIATICA: ICD-10-CM

## 2024-09-18 DIAGNOSIS — I10 ESSENTIAL HYPERTENSION: Primary | ICD-10-CM

## 2024-09-18 PROCEDURE — 97110 THERAPEUTIC EXERCISES: CPT | Performed by: PHYSICAL THERAPY ASSISTANT

## 2024-09-18 PROCEDURE — 99214 OFFICE O/P EST MOD 30 MIN: CPT | Performed by: NURSE PRACTITIONER

## 2024-09-18 RX ORDER — ACETAMINOPHEN AND CODEINE PHOSPHATE 300; 30 MG/1; MG/1
1 TABLET ORAL 4 TIMES DAILY PRN
COMMUNITY
Start: 2024-09-06 | End: 2024-09-18

## 2024-09-18 RX ORDER — CARVEDILOL 6.25 MG/1
6.25 TABLET ORAL 2 TIMES DAILY WITH MEALS
Qty: 60 TABLET | Refills: 0 | Status: SHIPPED | OUTPATIENT
Start: 2024-09-18

## 2024-09-18 RX ORDER — DILTIAZEM HYDROCHLORIDE 360 MG/1
360 CAPSULE, EXTENDED RELEASE ORAL DAILY
Qty: 90 CAPSULE | Refills: 3 | Status: SHIPPED | OUTPATIENT
Start: 2024-09-18 | End: 2025-09-13
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
## 2024-09-18 NOTE — PATIENT INSTRUCTIONS
Continue the diltazem 300 mg daily until you run out then start the 360 mg daily.    Start the carvedilol 6.125 mg twice daily.    Monitor your blood pressure daily and record in log. Bring log and BP machine to next visit.

## 2024-09-18 NOTE — PROGRESS NOTES
CHIEF COMPLAINT:     Chief Complaint   Patient presents with    Hypertension     Last OV BP Medication was Increased - pt is till having High BP's     Brought Home Reading with today  Pt mentioned due to her health issues she stays in a constant pain level of 7/10 - not sure if this is contributing to High BP's       HPI:   Sarita Ren is a 59 year old female coming in for BP check. She has been logging it at home and it is elevated, see log below. She did increase diltiazem to 300 mg daily. No chest pain, shortness of breath, or palpitations. Does have intermittent headaches. She does feel her BP is elevated due to her back pain. She is doing PT and seeing Dr. Darnell. Needs to get a CT scan and likely surgery but she is not sure if she can afford either.         Past Medical History:    Cephalgia    viral    Chronic diarrhea    Complex ovarian cyst    Left    Costochondral chest pain    R tenderness    Lower back pain    Left-constant    Recurrent sinusitis      Past Surgical History:   Procedure Laterality Date    Appendectomy      Other surgical history      elective       Social History:  Social History     Socioeconomic History    Marital status:    Occupational History    Occupation: teacher   Tobacco Use    Smoking status: Former     Current packs/day: 0.50     Average packs/day: 0.5 packs/day for 20.0 years (10.0 ttl pk-yrs)     Types: Cigarettes     Passive exposure: Never    Smokeless tobacco: Never   Vaping Use    Vaping status: Never Used   Substance and Sexual Activity    Alcohol use: Yes     Alcohol/week: 6.0 standard drinks of alcohol     Types: 6 Glasses of wine per week    Drug use: No   Other Topics Concern    Caffeine Concern Yes     Comment: 3 cups of coffee or iced tea weekly    Hobby Hazards No    Sleep Concern Yes    Stress Concern Yes    Weight Concern No    Special Diet No    Back Care Yes    Exercise Yes     Comment: daily    Seat Belt Yes    Self-Exams Yes      Family  History:  Family History   Problem Relation Age of Onset    Kidney Disease Mother     Heart Disease Mother     Dementia Mother     Hypertension Father     Dementia Father     Other (Other) Father       Allergies:  No Known Allergies   Current Meds:  Current Outpatient Medications   Medication Sig Dispense Refill    carvedilol 6.25 MG Oral Tab Take 1 tablet (6.25 mg total) by mouth 2 (two) times daily with meals. 60 tablet 0    dilTIAZem ER (TIADYLT ER) 360 MG Oral Capsule SR 24 Hr Take 1 capsule (360 mg total) by mouth daily. 90 capsule 3    traMADol 50 MG Oral Tab Take 1 tablet (50 mg total) by mouth every 6 (six) hours as needed for Pain. 20 tablet 0    gabapentin 100 MG Oral Cap Take 1 capsule (100 mg total) by mouth 3 (three) times daily. 60 capsule 0    HYDROcodone-acetaminophen 5-325 MG Oral Tab Take 1 tablet by mouth every 4 (four) hours as needed for Pain.      dilTIAZem HCl ER Beads (TIADYLT ER) 300 MG Oral Capsule SR 24 Hr Take 1 capsule (300 mg total) by mouth daily. 90 capsule 0    MULTIVITAMIN ADULT Oral Tab Take 1 tablet by mouth daily.      COLLAGEN OR Take 1 Dose by mouth daily.      gabapentin 300 MG Oral Cap Take 1 capsule (300 mg total) by mouth nightly as needed. 90 capsule 0    POTASSIUM OR Take 1 tablet by mouth daily.      aspirin 81 MG Oral Tab Take 1 tablet (81 mg total) by mouth daily.      Aspirin-Acetaminophen-Caffeine (EXCEDRIN OR) Take 1-2 tablets by mouth as needed.      Glucosamine HCl 1000 MG Oral Tab Take 1-2 capsules by mouth daily.         Counseling given: Not Answered       REVIEW OF SYSTEMS:   See HPI.    EXAM:     /86   Pulse 82   Temp 97.5 °F (36.4 °C) (Temporal)   Resp 16   Ht 5' 0.39\" (1.534 m)   Wt 114 lb (51.7 kg)   LMP 12/31/2013   SpO2 97%   BMI 21.98 kg/m²   Body mass index is 21.98 kg/m².   Vital signs reviewed. Appears stated age, well groomed, in no acute distress.  Physical Exam:  GENERAL: Patient is alert, awake and oriented, well developed, well  nourished.  HEENT: Head: Normocephalic, atraumatic.   HEART: RRR without murmur.  LUNGS: Clear to auscultation bilaterally, no rales/rhonchi/wheezing.  ABDOMEN: good BS's, no masses, HSM or tenderness  MUSCULOSKELETAL: No obvious joint deformity or swelling.   EXTREMITIES: No edema, no cyanosis, no clubbing,  NEURO: Oriented time three    LABS:      Lab Results   Component Value Date    WBC 5.8 09/29/2023    RBC 3.72 (L) 09/29/2023    HGB 13.4 09/29/2023    HCT 39.5 09/29/2023    .2 (H) 09/29/2023    MCH 36.0 (H) 09/29/2023    MCHC 33.9 09/29/2023    RDW 12.9 09/29/2023    .0 09/29/2023      Lab Results   Component Value Date    GLU 83 09/29/2023    BUN 9 09/29/2023    BUNCREA 14.0 06/18/2021    CREATSERUM 0.53 (L) 09/29/2023    ANIONGAP 10 09/29/2023    GFRNAA 103 06/18/2021    GFRAA 119 06/18/2021    CA 8.9 09/29/2023    OSMOCALC 268 (L) 09/29/2023    ALKPHO 76 09/29/2023    AST 18 09/29/2023    ALT 27 09/29/2023    BILT 0.4 09/29/2023    TP 8.0 09/29/2023    ALB 4.1 09/29/2023    GLOBULIN 3.9 09/29/2023     (L) 09/29/2023    K 4.1 09/29/2023    CL 97 (L) 09/29/2023    CO2 23.0 09/29/2023      Lab Results   Component Value Date    CHOLEST 215 (H) 09/29/2023    TRIG 67 09/29/2023     (H) 09/29/2023    LDL 98 09/29/2023    VLDL 11 09/29/2023    NONHDLC 110 09/29/2023      Lab Results   Component Value Date    TSH 1.330 09/29/2023      Lab Results   Component Value Date     09/29/2023    A1C 5.5 09/29/2023        IMAGING:     MRI SPINE LUMBAR (CPT=72148)    Result Date: 9/11/2024  PROCEDURE:  MRI SPINE LUMBAR (CPT=72148)  COMPARISON:  [facility], XR, XR LUMBAR SPINE (MIN 4 VIEWS) (CPT=72110), 8/28/2024, 10:28 AM.  INDICATIONS:  G89.29 Chronic bilateral low back pain with bilateral sciatica M54.41 Chronic bilateral low back pain with bilateral sciatica M54.42 Chronic bilateral low back*  TECHNIQUE:  Multiplanar T1 and T2 weighted images including fat suppression sequences.   Images acquired in sagittal and axial planes.   PATIENT STATED HISTORY: (As transcribed by Technologist)  Patient states left leg numbness and lower back pain.    FINDINGS: Moderate dextroscoliosis of the lumbar spine.  3-4 mm anterior listhesis of L4 over L5.  Vertebral body heights are maintained throughout the lumbar spine. Mild loss of L2-L3 disc space with endplate osteoarthritic changes. Marrow signal is heterogeneous. The conus is at T12/L1. The visualized portion of the spinal cord is of normal caliber without focal signal abnormality. T12-L1:  Minimal disc bulge without spinal canal or neural foraminal stenosis. L1-L2:  Minimal disc bulge without spinal canal stenosis.  Moderate left neural foraminal stenosis. L2-L3:  Broad-based disc bulge with facet hypertrophic changes causing severe spinal canal stenosis.  Marked severe left neural foraminal stenosis. L3-L4:  Broad-based disc bulge with facet hypertrophic change.  Marked severe spinal canal stenosis.  Marked severe left neural foraminal stenosis. L4-L5:  Broad-based disc bulge with facet hypertrophic changes along with grade 1 anterior listhesis of L4 over L5 is causing severe spinal canal stenosis.  Severe right neural foraminal stenosis. L5-S1:  Right paracentral disc bulge with foraminal extension along with moderate to marked facet hypertrophic changes on the right and mild facet hypertrophic change to left.  There is marked severe right neural foraminal stenosis without spinal canal stenosis.            CONCLUSION:  Moderate dextroscoliosis of the lumbar spine with associated marked severe osteoarthritic changes as described above.   LOCATION:  [facility]   Dictated by (CST): Kuldip Villalta MD on 9/11/2024 at 2:55 PM     Finalized by (CST): Kuldip Villalta MD on 9/11/2024 at 3:05 PM       XR LUMBAR SPINE (MIN 4 VIEWS) (CPT=72110)    Result Date: 8/28/2024  PROCEDURE:  XR LUMBAR SPINE (MIN 4 VIEWS) (CPT=72110)  TECHNIQUE:  AP, lateral, oblique,  and coned down L5-S1 views were obtained.  COMPARISON:  None.  INDICATIONS:  G89.29 Chronic bilateral low back pain with bilateral sciatica M54.41 Chronic bilateral low back pain with bilateral sciatica M54.42 Chronic bilateral low back*  PATIENT STATED HISTORY: (As transcribed by Technologist)  Lumbar pain Chronic, effecting numbness to legs.    FINDINGS:    There is severe apex right lumbar rotoscoliosis.  There is straightening of the expected lordosis.  There is a few mm retrolisthesis of L1 on L2 and a few mm anterolisthesis of L3 on L4.  Approximately 20-25% anterolisthesis of L4 on L5.  There is severe L4-L5 and L5-S1 facet arthropathy.  Scattered up to moderate to severe disc degenerative changes, most pronounced at L1-L2, L3-L4 and L4-L5.  Vertebral body heights are within the limits of normal.  There is mild to moderate bilateral sacroiliac joint arthritis.             CONCLUSION:  1. Scoliotic and degenerative changes. 2. Grade 1 anterolisthesis L4-L5. 3. A few mm retrolisthesis L1-L2 and a few mm anterolisthesis L3-L4.    LOCATION:  [facility]   Dictated by (CST): Ethan Bell MD on 8/28/2024 at 11:51 AM     Finalized by (CST): Ethan Bell MD on 8/28/2024 at 11:54 AM          ASSESSMENT AND PLAN:   1. Essential hypertension  -Increase diltiazem to 360 mg. She reports she just picked up 90 day supply of 300 mg and cannot afford the 360 yet. Advised to continue 300 mg for now and start 360 mg once she is done with those  -Start carvedilol 6.25 mg BID, SE discussed  -Continue to monitor BP at home and record in log  -Bring log and BP machine/cuff to next visit  - carvedilol 6.25 MG Oral Tab; Take 1 tablet (6.25 mg total) by mouth 2 (two) times daily with meals.  Dispense: 60 tablet; Refill: 0  - dilTIAZem ER (TIADYLT ER) 360 MG Oral Capsule SR 24 Hr; Take 1 capsule (360 mg total) by mouth daily.  Dispense: 90 capsule; Refill: 3    2. Chronic bilateral low back pain with bilateral sciatica  -Continue  PT  -Continue management per Dr. Darnell   -Advised return to work clearance needs to come from Dr. Darnell      The patient indicates understanding of these issues and agrees to the plan.  Return in about 1 month (around 10/18/2024) for bp check.    Bhumika Jones, APRN  9/18/2024

## 2024-09-19 ENCOUNTER — MED REC SCAN ONLY (OUTPATIENT)
Dept: INTERNAL MEDICINE CLINIC | Facility: CLINIC | Age: 59
End: 2024-09-19

## 2024-09-20 ENCOUNTER — APPOINTMENT (OUTPATIENT)
Dept: PHYSICAL THERAPY | Facility: HOSPITAL | Age: 59
End: 2024-09-20
Attending: NURSE PRACTITIONER
Payer: COMMERCIAL

## 2024-09-25 ENCOUNTER — OFFICE VISIT (OUTPATIENT)
Dept: PHYSICAL THERAPY | Facility: HOSPITAL | Age: 59
End: 2024-09-25
Attending: NURSE PRACTITIONER
Payer: COMMERCIAL

## 2024-09-25 ENCOUNTER — TELEPHONE (OUTPATIENT)
Dept: INTERNAL MEDICINE CLINIC | Facility: CLINIC | Age: 59
End: 2024-09-25

## 2024-09-25 DIAGNOSIS — Z13.0 SCREENING FOR ENDOCRINE, METABOLIC AND IMMUNITY DISORDER: ICD-10-CM

## 2024-09-25 DIAGNOSIS — Z13.228 SCREENING FOR ENDOCRINE, METABOLIC AND IMMUNITY DISORDER: ICD-10-CM

## 2024-09-25 DIAGNOSIS — Z13.29 SCREENING FOR ENDOCRINE, METABOLIC AND IMMUNITY DISORDER: ICD-10-CM

## 2024-09-25 DIAGNOSIS — Z13.220 SCREENING FOR LIPID DISORDERS: ICD-10-CM

## 2024-09-25 DIAGNOSIS — Z13.1 SCREENING FOR DIABETES MELLITUS: ICD-10-CM

## 2024-09-25 DIAGNOSIS — Z00.00 ENCOUNTER FOR ANNUAL PHYSICAL EXAM: Primary | ICD-10-CM

## 2024-09-25 DIAGNOSIS — R71.8 ELEVATED MCV: ICD-10-CM

## 2024-09-25 DIAGNOSIS — Z13.29 SCREENING FOR THYROID DISORDER: ICD-10-CM

## 2024-09-25 DIAGNOSIS — Z13.0 SCREENING FOR DEFICIENCY ANEMIA: ICD-10-CM

## 2024-09-25 PROCEDURE — 97110 THERAPEUTIC EXERCISES: CPT | Performed by: PHYSICAL THERAPY ASSISTANT
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
## 2024-09-25 NOTE — TELEPHONE ENCOUNTER
Pt called and has labs ordered in system that will  on 10/3/2024 - pt wondering if she should complete labs prior to 10/3/24 or wait to complete once she has her physical appointment and have labs reordered then at PE appt.    Pt seen on 24 - High BP's, given meds, told to monitor and follow up in 1 month for BP Check    Pt is also Due for PE as of 24 - FYI    Should pt come in for BP chk apt first and then PE apt with labs to follow after that appointment??    Please Advise on Correct Plan of Action for Patient.

## 2024-09-25 NOTE — TELEPHONE ENCOUNTER
Called pt twice but got busy signal each time. SafetyTat sent to pt with labs and fasting protocol.

## 2024-09-27 ENCOUNTER — APPOINTMENT (OUTPATIENT)
Dept: PHYSICAL THERAPY | Facility: HOSPITAL | Age: 59
End: 2024-09-27
Attending: NURSE PRACTITIONER
Payer: COMMERCIAL

## 2024-09-27 ENCOUNTER — LAB ENCOUNTER (OUTPATIENT)
Dept: LAB | Age: 59
End: 2024-09-27
Attending: NURSE PRACTITIONER
Payer: COMMERCIAL

## 2024-09-27 ENCOUNTER — TELEPHONE (OUTPATIENT)
Dept: PHYSICAL THERAPY | Facility: HOSPITAL | Age: 59
End: 2024-09-27

## 2024-09-27 DIAGNOSIS — Z13.0 SCREENING FOR ENDOCRINE, METABOLIC AND IMMUNITY DISORDER: ICD-10-CM

## 2024-09-27 DIAGNOSIS — Z13.1 SCREENING FOR DIABETES MELLITUS: ICD-10-CM

## 2024-09-27 DIAGNOSIS — Z13.29 SCREENING FOR THYROID DISORDER: ICD-10-CM

## 2024-09-27 DIAGNOSIS — Z13.228 SCREENING FOR ENDOCRINE, METABOLIC AND IMMUNITY DISORDER: ICD-10-CM

## 2024-09-27 DIAGNOSIS — Z13.0 SCREENING FOR DEFICIENCY ANEMIA: ICD-10-CM

## 2024-09-27 DIAGNOSIS — Z13.220 SCREENING FOR LIPID DISORDERS: ICD-10-CM

## 2024-09-27 DIAGNOSIS — R71.8 ELEVATED MCV: ICD-10-CM

## 2024-09-27 DIAGNOSIS — Z13.29 SCREENING FOR ENDOCRINE, METABOLIC AND IMMUNITY DISORDER: ICD-10-CM

## 2024-09-27 DIAGNOSIS — Z00.00 ENCOUNTER FOR ANNUAL PHYSICAL EXAM: ICD-10-CM

## 2024-09-27 LAB
ALBUMIN SERPL-MCNC: 4.8 G/DL (ref 3.2–4.8)
ALBUMIN/GLOB SERPL: 1.7 {RATIO} (ref 1–2)
ALP LIVER SERPL-CCNC: 99 U/L
ALT SERPL-CCNC: 16 U/L
ANION GAP SERPL CALC-SCNC: 6 MMOL/L (ref 0–18)
AST SERPL-CCNC: 23 U/L (ref ?–34)
BASOPHILS # BLD AUTO: 0.03 X10(3) UL (ref 0–0.2)
BASOPHILS NFR BLD AUTO: 0.6 %
BILIRUB SERPL-MCNC: 0.6 MG/DL (ref 0.2–1.1)
BUN BLD-MCNC: 7 MG/DL (ref 9–23)
CALCIUM BLD-MCNC: 9.8 MG/DL (ref 8.7–10.4)
CHLORIDE SERPL-SCNC: 99 MMOL/L (ref 98–112)
CHOLEST SERPL-MCNC: 232 MG/DL (ref ?–200)
CO2 SERPL-SCNC: 25 MMOL/L (ref 21–32)
CREAT BLD-MCNC: 0.58 MG/DL
EGFRCR SERPLBLD CKD-EPI 2021: 104 ML/MIN/1.73M2 (ref 60–?)
EOSINOPHIL # BLD AUTO: 0.09 X10(3) UL (ref 0–0.7)
EOSINOPHIL NFR BLD AUTO: 1.7 %
ERYTHROCYTE [DISTWIDTH] IN BLOOD BY AUTOMATED COUNT: 12.9 %
EST. AVERAGE GLUCOSE BLD GHB EST-MCNC: 105 MG/DL (ref 68–126)
FASTING PATIENT LIPID ANSWER: YES
FASTING STATUS PATIENT QL REPORTED: YES
FOLATE SERPL-MCNC: 30.2 NG/ML (ref 5.4–?)
GLOBULIN PLAS-MCNC: 2.8 G/DL (ref 2–3.5)
GLUCOSE BLD-MCNC: 108 MG/DL (ref 70–99)
HBA1C MFR BLD: 5.3 % (ref ?–5.7)
HCT VFR BLD AUTO: 37.6 %
HDLC SERPL-MCNC: 110 MG/DL (ref 40–59)
HGB BLD-MCNC: 13.6 G/DL
IMM GRANULOCYTES # BLD AUTO: 0.04 X10(3) UL (ref 0–1)
IMM GRANULOCYTES NFR BLD: 0.7 %
LDLC SERPL CALC-MCNC: 113 MG/DL (ref ?–100)
LYMPHOCYTES # BLD AUTO: 1.27 X10(3) UL (ref 1–4)
LYMPHOCYTES NFR BLD AUTO: 23.3 %
MCH RBC QN AUTO: 37.3 PG (ref 26–34)
MCHC RBC AUTO-ENTMCNC: 36.2 G/DL (ref 31–37)
MCV RBC AUTO: 103 FL
MONOCYTES # BLD AUTO: 0.71 X10(3) UL (ref 0.1–1)
MONOCYTES NFR BLD AUTO: 13.1 %
NEUTROPHILS # BLD AUTO: 3.3 X10 (3) UL (ref 1.5–7.7)
NEUTROPHILS # BLD AUTO: 3.3 X10(3) UL (ref 1.5–7.7)
NEUTROPHILS NFR BLD AUTO: 60.6 %
NONHDLC SERPL-MCNC: 122 MG/DL (ref ?–130)
OSMOLALITY SERPL CALC.SUM OF ELEC: 269 MOSM/KG (ref 275–295)
PLATELET # BLD AUTO: 367 10(3)UL (ref 150–450)
POTASSIUM SERPL-SCNC: 4.5 MMOL/L (ref 3.5–5.1)
PROT SERPL-MCNC: 7.6 G/DL (ref 5.7–8.2)
RBC # BLD AUTO: 3.65 X10(6)UL
SODIUM SERPL-SCNC: 130 MMOL/L (ref 136–145)
TRIGL SERPL-MCNC: 53 MG/DL (ref 30–149)
TSI SER-ACNC: 1.34 MIU/ML (ref 0.55–4.78)
VIT B12 SERPL-MCNC: 862 PG/ML (ref 211–911)
VLDLC SERPL CALC-MCNC: 9 MG/DL (ref 0–30)
WBC # BLD AUTO: 5.4 X10(3) UL (ref 4–11)

## 2024-09-27 PROCEDURE — 83036 HEMOGLOBIN GLYCOSYLATED A1C: CPT

## 2024-09-27 PROCEDURE — 82607 VITAMIN B-12: CPT

## 2024-09-27 PROCEDURE — 80061 LIPID PANEL: CPT

## 2024-09-27 PROCEDURE — 36415 COLL VENOUS BLD VENIPUNCTURE: CPT

## 2024-09-27 PROCEDURE — 85025 COMPLETE CBC W/AUTO DIFF WBC: CPT

## 2024-09-27 PROCEDURE — 84443 ASSAY THYROID STIM HORMONE: CPT

## 2024-09-27 PROCEDURE — 82746 ASSAY OF FOLIC ACID SERUM: CPT

## 2024-09-27 PROCEDURE — 80053 COMPREHEN METABOLIC PANEL: CPT

## 2024-10-02 ENCOUNTER — TELEPHONE (OUTPATIENT)
Facility: CLINIC | Age: 59
End: 2024-10-02

## 2024-10-02 DIAGNOSIS — M41.50 DEGENERATIVE SCOLIOSIS: Primary | ICD-10-CM

## 2024-10-02 NOTE — TELEPHONE ENCOUNTER
Patient called to get referral for pain specialist. Please contact patient thru mobile number or mychart

## 2024-10-09 ENCOUNTER — OFFICE VISIT (OUTPATIENT)
Dept: INTERNAL MEDICINE CLINIC | Facility: CLINIC | Age: 59
End: 2024-10-09
Payer: COMMERCIAL

## 2024-10-09 VITALS
OXYGEN SATURATION: 98 % | WEIGHT: 112.38 LBS | TEMPERATURE: 98 F | BODY MASS INDEX: 21.77 KG/M2 | DIASTOLIC BLOOD PRESSURE: 80 MMHG | HEART RATE: 52 BPM | SYSTOLIC BLOOD PRESSURE: 130 MMHG | HEIGHT: 60.16 IN | RESPIRATION RATE: 16 BRPM

## 2024-10-09 DIAGNOSIS — G89.29 CHRONIC BILATERAL LOW BACK PAIN WITH RIGHT-SIDED SCIATICA: ICD-10-CM

## 2024-10-09 DIAGNOSIS — I10 ESSENTIAL HYPERTENSION: ICD-10-CM

## 2024-10-09 DIAGNOSIS — M54.41 CHRONIC BILATERAL LOW BACK PAIN WITH RIGHT-SIDED SCIATICA: ICD-10-CM

## 2024-10-09 DIAGNOSIS — F32.A ANXIETY AND DEPRESSION: ICD-10-CM

## 2024-10-09 DIAGNOSIS — Z12.11 ENCOUNTER FOR SCREENING FOR MALIGNANT NEOPLASM OF COLON: ICD-10-CM

## 2024-10-09 DIAGNOSIS — Z00.00 ENCOUNTER FOR ANNUAL PHYSICAL EXAM: Primary | ICD-10-CM

## 2024-10-09 DIAGNOSIS — F41.9 ANXIETY AND DEPRESSION: ICD-10-CM

## 2024-10-09 DIAGNOSIS — E87.1 HYPONATREMIA: ICD-10-CM

## 2024-10-09 PROCEDURE — 99214 OFFICE O/P EST MOD 30 MIN: CPT | Performed by: NURSE PRACTITIONER

## 2024-10-09 PROCEDURE — 99396 PREV VISIT EST AGE 40-64: CPT | Performed by: NURSE PRACTITIONER

## 2024-10-09 RX ORDER — CARVEDILOL 6.25 MG/1
6.25 TABLET ORAL 2 TIMES DAILY WITH MEALS
Qty: 180 TABLET | Refills: 0 | Status: SHIPPED | OUTPATIENT
Start: 2024-10-09

## 2024-10-09 NOTE — PROGRESS NOTES
CHIEF COMPLAINT        Chief Complaint   Patient presents with    Physical     PAP: 2021 due: 2026, MIGEL: N/A - Refuses, Colon:  FIt Cards = Negative 9/29/23    Hypertension     HPI:   Sarita Ren is a 59 year old female who presents for a complete physical exam and med check.    Diet is fair. Exercise is nothing at this time. Doing PT for her low back pain. Alcohol use is 2 glasses of wine 3 times weekly.  She is a former smoker.  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 She is  to Joss who is wheelchair-bound due to a stroke. She is the primary caregiver for her .      Labs reviewed and discussed with patient. Vaccines reviewed. Due for flu, tdap, shingles, and covid booster - declines all. Pap UTD, due in 2026. Mammogram never done, declines despite education on importance. Reinforced self breast exams. C-scope never done declines. FIT card done 9/23/23 negative, will re-order today. Wears seatbelt. No texting and driving. No skin concerns.      HTN: BP stable today although her home readings have been high. Her machine was checked and was not accurate. Patient takes the BP medications as directed, no reported side effects. Denies chest pain, SOB, palpitations, headaches, visual disturbances.    Hyponatremia: chronic, stable.     Depression/anxiety: Stable per patient. Denies any medication or therapy. PHQ-9: 0, JIE-7: 1, CSSR: No risk.    Wt Readings from Last 6 Encounters:   10/09/24 112 lb 6.4 oz (51 kg)   09/18/24 114 lb (51.7 kg)   08/28/24 114 lb 12.8 oz (52.1 kg)   09/29/23 117 lb 3.2 oz (53.2 kg)   09/23/22 120 lb 8 oz (54.7 kg)   09/19/22 120 lb 8 oz (54.7 kg)     Body mass index is 21.84 kg/m².     Cholesterol, Total (mg/dL)   Date Value   09/27/2024 232 (H)   09/29/2023 215 (H)   09/23/2022 211 (H)     HDL Cholesterol (mg/dL)   Date Value   09/27/2024 110 (H)   09/29/2023 105 (H)   09/23/2022 93 (H)     LDL Cholesterol  (mg/dL)   Date Value   2024 113 (H)   2023 98   2022 107 (H)     AST (U/L)   Date Value   2024 23   2023 18   2022 19     ALT (U/L)   Date Value   2024 16   2023 27   2022 24        Current Outpatient Medications   Medication Sig Dispense Refill    carvedilol 6.25 MG Oral Tab Take 1 tablet (6.25 mg total) by mouth 2 (two) times daily with meals. 180 tablet 0    traMADol 50 MG Oral Tab Take 1 tablet (50 mg total) by mouth every 6 (six) hours as needed for Pain. 20 tablet 0    gabapentin 100 MG Oral Cap Take 1 capsule (100 mg total) by mouth 3 (three) times daily. 60 capsule 0    HYDROcodone-acetaminophen 5-325 MG Oral Tab Take 1 tablet by mouth every 4 (four) hours as needed for Pain.      dilTIAZem HCl ER Beads (TIADYLT ER) 300 MG Oral Capsule SR 24 Hr Take 1 capsule (300 mg total) by mouth daily. 90 capsule 0    MULTIVITAMIN ADULT Oral Tab Take 1 tablet by mouth daily.      COLLAGEN OR Take 1 Dose by mouth daily.      gabapentin 300 MG Oral Cap Take 1 capsule (300 mg total) by mouth nightly as needed. 90 capsule 0    POTASSIUM OR Take 1 tablet by mouth daily.      aspirin 81 MG Oral Tab Take 1 tablet (81 mg total) by mouth daily.      Aspirin-Acetaminophen-Caffeine (EXCEDRIN OR) Take 1-2 tablets by mouth as needed.      Glucosamine HCl 1000 MG Oral Tab Take 1-2 capsules by mouth daily.      dilTIAZem ER (TIADYLT ER) 360 MG Oral Capsule SR 24 Hr Take 1 capsule (360 mg total) by mouth daily. (Patient not taking: Reported on 10/9/2024) 90 capsule 3      Allergies[1]   Past Medical History:    Cephalgia    viral    Chronic diarrhea    Complex ovarian cyst    Left    Costochondral chest pain    R tenderness    Lower back pain    Left-constant    Recurrent sinusitis      Past Surgical History:   Procedure Laterality Date    Appendectomy      Appendectomy  1984    Other surgical history      elective       Family History   Problem Relation Age of Onset     Kidney Disease Mother     Heart Disease Mother     Dementia Mother     Hypertension Father     Dementia Father     Other (Other) Father       Social History:   Social History     Socioeconomic History    Marital status:    Occupational History    Occupation: teacher   Tobacco Use    Smoking status: Former     Current packs/day: 0.50     Average packs/day: 0.5 packs/day for 20.0 years (10.0 ttl pk-yrs)     Types: Cigarettes     Passive exposure: Never    Smokeless tobacco: Never   Vaping Use    Vaping status: Never Used   Substance and Sexual Activity    Alcohol use: Yes     Alcohol/week: 6.0 standard drinks of alcohol     Types: 6 Glasses of wine per week    Drug use: No   Other Topics Concern    Caffeine Concern No    Hobby Hazards No    Sleep Concern Yes    Stress Concern Yes    Weight Concern No    Special Diet No    Back Care Yes    Exercise No    Seat Belt Yes    Self-Exams Yes          REVIEW OF SYSTEMS:   GENERAL: feels well otherwise  SKIN: denies any unusual skin lesions  EYES: denies blurred vision or double vision  HEENT: denies nasal congestion, sinus pain or ST  LUNGS: denies shortness of breath with exertion  CARDIOVASCULAR: denies chest pain on exertion  GI: denies abdominal pain,denies heartburn  : denies vaginal discharge or dysuria  MUSCULOSKELETAL: back pain+  NEURO: denies headaches  PSYCH: see HPI  HEMATOLOGIC: denies hx of anemia  ENDOCRINE: denies thyroid history  ALL/ASTHMA: denies hx of allergy or asthma    EXAM:   /80 (BP Location: Left arm, Patient Position: Sitting, Cuff Size: adult)   Pulse 52   Temp 97.9 °F (36.6 °C) (Temporal)   Resp 16   Ht 5' 0.16\" (1.528 m)   Wt 112 lb 6.4 oz (51 kg)   LMP 12/31/2013   SpO2 98%   BMI 21.84 kg/m²   Body mass index is 21.84 kg/m².   GENERAL: well developed, well nourished, in no apparent distress  SKIN: no rashes, no suspicious lesions  HEENT: atraumatic, normocephalic, ears are clear  EYES: PERRLA, EOMI, conjunctiva are  clear  NECK: supple, no adenopathy, no bruits  BREAST: no dominant or suspicious mass, no nipple discharge  LUNGS: clear to auscultation  CARDIO: RRR without murmur  GI: good BS's, no masses, HSM or tenderness  : Declines.  MUSCULOSKELETAL: No obvious joint swelling or deformity  EXTREMITIES: no cyanosis, clubbing or edema  NEURO: Oriented times three, cranial nerves are intact, motor and sensory are grossly intact    LABS:     Lab Results   Component Value Date    WBC 5.4 09/27/2024    RBC 3.65 (L) 09/27/2024    HGB 13.6 09/27/2024    HCT 37.6 09/27/2024    .0 (H) 09/27/2024    MCH 37.3 (H) 09/27/2024    MCHC 36.2 09/27/2024    RDW 12.9 09/27/2024    .0 09/27/2024      Lab Results   Component Value Date     (H) 09/27/2024    BUN 7 (L) 09/27/2024    BUNCREA 14.0 06/18/2021    CREATSERUM 0.58 09/27/2024    ANIONGAP 6 09/27/2024    GFRNAA 103 06/18/2021    GFRAA 119 06/18/2021    CA 9.8 09/27/2024    OSMOCALC 269 (L) 09/27/2024    ALKPHO 99 09/27/2024    AST 23 09/27/2024    ALT 16 09/27/2024    BILT 0.6 09/27/2024    TP 7.6 09/27/2024    ALB 4.8 09/27/2024    GLOBULIN 2.8 09/27/2024     (L) 09/27/2024    K 4.5 09/27/2024    CL 99 09/27/2024    CO2 25.0 09/27/2024      Lab Results   Component Value Date    CHOLEST 232 (H) 09/27/2024    TRIG 53 09/27/2024     (H) 09/27/2024     (H) 09/27/2024    VLDL 9 09/27/2024    NONHDLC 122 09/27/2024      Lab Results   Component Value Date    TSH 1.342 09/27/2024      Lab Results   Component Value Date     09/27/2024    A1C 5.3 09/27/2024       IMAGING:     MRI SPINE LUMBAR (CPT=72148)    Result Date: 9/11/2024  PROCEDURE:  MRI SPINE LUMBAR (CPT=72148)  COMPARISON:  [place], XR, XR LUMBAR SPINE (MIN 4 VIEWS) (CPT=72110), 8/28/2024, 10:28 AM.  INDICATIONS:  G89.29 Chronic bilateral low back pain with bilateral sciatica M54.41 Chronic bilateral low back pain with bilateral sciatica M54.42 Chronic bilateral low back*   TECHNIQUE:  Multiplanar T1 and T2 weighted images including fat suppression sequences.  Images acquired in sagittal and axial planes.   PATIENT STATED HISTORY: (As transcribed by Technologist)  Patient states left leg numbness and lower back pain.    FINDINGS: Moderate dextroscoliosis of the lumbar spine.  3-4 mm anterior listhesis of L4 over L5.  Vertebral body heights are maintained throughout the lumbar spine. Mild loss of L2-L3 disc space with endplate osteoarthritic changes. Marrow signal is heterogeneous. The conus is at T12/L1. The visualized portion of the spinal cord is of normal caliber without focal signal abnormality. T12-L1:  Minimal disc bulge without spinal canal or neural foraminal stenosis. L1-L2:  Minimal disc bulge without spinal canal stenosis.  Moderate left neural foraminal stenosis. L2-L3:  Broad-based disc bulge with facet hypertrophic changes causing severe spinal canal stenosis.  Marked severe left neural foraminal stenosis. L3-L4:  Broad-based disc bulge with facet hypertrophic change.  Marked severe spinal canal stenosis.  Marked severe left neural foraminal stenosis. L4-L5:  Broad-based disc bulge with facet hypertrophic changes along with grade 1 anterior listhesis of L4 over L5 is causing severe spinal canal stenosis.  Severe right neural foraminal stenosis. L5-S1:  Right paracentral disc bulge with foraminal extension along with moderate to marked facet hypertrophic changes on the right and mild facet hypertrophic change to left.  There is marked severe right neural foraminal stenosis without spinal canal stenosis.            CONCLUSION:  Moderate dextroscoliosis of the lumbar spine with associated marked severe osteoarthritic changes as described above.   LOCATION:  [facility]   Dictated by (CST): Kuldip Villalta MD on 9/11/2024 at 2:55 PM     Finalized by (CST): Kuldip Villalta MD on 9/11/2024 at 3:05 PM          ASSESSMENT AND PLAN:     1. Encounter for annual physical exam  Sarita  Randa Ren is a 59 year old female who presents for a complete physical exam. Pt' s weight is Body mass index is 21.84 kg/m².. Recommended regular exercise. The patient indicates understanding of these issues and agrees to the plan.  -UTD with labs  -Due for flu, tdap, shingles, and covid booster - declines all  -Pap UTD, due in 2026. Declines pelvic exam.  -Mammogram never done, declines despite education on importance.   -C-scope never done declines. FIT card done 9/23/23 negative, will re-order today.    2. Chronic bilateral low back pain with right-sided sciatica  -Continue PT  -Continue to see Dr. Darnell and Dr. Overton. She is asking for extension of her leave of absence for work. She is advised to reach out to her back specialist.     3. Essential hypertension  -BP stable in office  -Continue current meds. She will switch to diltiazem 360 mg once she is out of the 300 mg capsules  -Get new BP machine and can come with nurse visit to get it calibrated  - DME - External  - carvedilol 6.25 MG Oral Tab; Take 1 tablet (6.25 mg total) by mouth 2 (two) times daily with meals.  Dispense: 180 tablet; Refill: 0    4. Hyponatremia  -Stable. Continue to monitor    5. Anxiety and depression  -Stable, continue to monitor  -Declines medication or therapy    6. Encounter for screening for malignant neoplasm of colon  - Occult Blood, Fecal, FIT Immunoassay; Future     Return in about 1 year (around 10/9/2025) for physical.    Bhumika Jones, NESSA  10/9/2024         [1] No Known Allergies

## 2024-10-10 ENCOUNTER — TELEPHONE (OUTPATIENT)
Dept: ORTHOPEDICS CLINIC | Facility: CLINIC | Age: 59
End: 2024-10-10

## 2024-10-10 NOTE — TELEPHONE ENCOUNTER
Patient called to see if Family Medical Leave Act  forms from were rec'd. Informed patient forms not rec'd. Provided email to forms. Patient stated she will email today and call when completed. Details below. Dr. Darnell tasked. If approved forms need to be completed by 10/20/24 so please expedite if possible.    Type of Leave: Continuous  Reason for Leave: Degenerative scoliosis  Start date of leave: 10/13/24 to 11/24/24  End date of leave:11/24/24  How many flare ups per month/length?:  How many appts per month/length?:   Was Fee and Turnaround info Given?:

## 2024-10-10 NOTE — TELEPHONE ENCOUNTER
Dr. Darnell,     Patient is requesting a continuation of leave absence from work from 10/13/24 to 11/24/24 due to degenerative scoliosis. Do you support?    Thanks so much for your time,  Isabel Scott dept

## 2024-10-11 ENCOUNTER — OFFICE VISIT (OUTPATIENT)
Dept: PHYSICAL THERAPY | Facility: HOSPITAL | Age: 59
End: 2024-10-11
Attending: NURSE PRACTITIONER
Payer: COMMERCIAL

## 2024-10-11 PROCEDURE — 97110 THERAPEUTIC EXERCISES: CPT
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
## 2024-10-14 ENCOUNTER — MED REC SCAN ONLY (OUTPATIENT)
Age: 59
End: 2024-10-14

## 2024-10-14 NOTE — TELEPHONE ENCOUNTER
Received Family Medical Leave Act forms via fax in the forms department. Valid Outside Authorization with Forms. Logged For processing.

## 2024-10-14 NOTE — TELEPHONE ENCOUNTER
Patient called to check status of receipt of forms, forms received, asked to be processed promptly, pulled into my box for processing.

## 2024-10-14 NOTE — TELEPHONE ENCOUNTER
Dr. Darnell,       Please sign off on form if you agree to: Family Medical Leave Act Cont. 10/13/2024-11/24/2024 Return to work 11/25/2024  (place your signature on the first page only)    -From your Inbasket, Highlight the patient and click Chart   -Double click the 10/10/2024 Forms Completion telephone encounter  -Scroll down to the Media section   -Click the blue Hyperlink: Family Medical Leave Act Dr. Jose Angel Darnell 10/14/2024  -Click Acknowledge located in the top right ribbon/menu   -Drag the mouse into the blank space of the document and a + sign will appear. Left click to   electronically sign the document.     Thank you,  Shazia ZAPATA

## 2024-10-16 NOTE — TELEPHONE ENCOUNTER
Bhumikasergio Stoneda      Please sign off on form if you agree to: Family Medical Leave Act 10/13/2024-11/24/2024 W/ Return to work 11/25/2024  (place your signature on the first page only)    -From your Inbasket, Highlight the patient and click Chart   -Double click the 10/10/2024 Forms Completion telephone encounter  -Scroll down to the Media section   -Click the blue Hyperlink: Family Medical Leave Act 2 Bhumika Karen 10/16/2024  -Click Acknowledge located in the top right ribbon/menu   -Drag the mouse into the blank space of the document and a + sign will appear. Left click to   electronically sign the document.     Thank you,  Shazia ZAPATA

## 2024-10-16 NOTE — TELEPHONE ENCOUNTER
Spoke to patient regarding status of forms, informed her of providers message below. Patient verbally understood.    Message sent to rep for completion.

## 2024-10-17 NOTE — TELEPHONE ENCOUNTER
Family Medical Leave Act forms attempted to be faxed to [facility] @ [phone] as stated on Release of Information, failed a couple times, uploading to patient via [employer] archiving forms.

## 2024-10-20 PROCEDURE — 82274 ASSAY TEST FOR BLOOD FECAL: CPT

## 2024-10-21 ENCOUNTER — LAB ENCOUNTER (OUTPATIENT)
Dept: LAB | Age: 59
End: 2024-10-21
Attending: NURSE PRACTITIONER
Payer: COMMERCIAL

## 2024-10-21 ENCOUNTER — OFFICE VISIT (OUTPATIENT)
Dept: PHYSICAL THERAPY | Facility: HOSPITAL | Age: 59
End: 2024-10-21
Attending: NURSE PRACTITIONER
Payer: COMMERCIAL

## 2024-10-21 DIAGNOSIS — Z12.11 ENCOUNTER FOR SCREENING FOR MALIGNANT NEOPLASM OF COLON: ICD-10-CM

## 2024-10-21 LAB — HEMOCCULT STL QL: POSITIVE

## 2024-10-21 PROCEDURE — 97110 THERAPEUTIC EXERCISES: CPT
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
## 2024-10-22 DIAGNOSIS — Z12.11 ENCOUNTER FOR SCREENING FOR MALIGNANT NEOPLASM OF COLON: ICD-10-CM

## 2024-10-22 DIAGNOSIS — R19.5 POSITIVE FIT (FECAL IMMUNOCHEMICAL TEST): Primary | ICD-10-CM

## 2024-10-25 ENCOUNTER — OFFICE VISIT (OUTPATIENT)
Dept: PAIN CLINIC | Facility: CLINIC | Age: 59
End: 2024-10-25
Payer: COMMERCIAL

## 2024-10-25 VITALS — OXYGEN SATURATION: 97 % | SYSTOLIC BLOOD PRESSURE: 126 MMHG | DIASTOLIC BLOOD PRESSURE: 84 MMHG | HEART RATE: 66 BPM

## 2024-10-25 DIAGNOSIS — M48.062 SPINAL STENOSIS OF LUMBAR REGION WITH NEUROGENIC CLAUDICATION: Primary | ICD-10-CM

## 2024-10-25 PROCEDURE — 99204 OFFICE O/P NEW MOD 45 MIN: CPT | Performed by: ANESTHESIOLOGY

## 2024-10-25 RX ORDER — METHYLPREDNISOLONE 4 MG/1
TABLET ORAL
Qty: 1 EACH | Refills: 0 | Status: SHIPPED | OUTPATIENT
Start: 2024-10-25

## 2024-10-25 RX ORDER — MELOXICAM 15 MG/1
15 TABLET ORAL DAILY
Qty: 30 TABLET | Refills: 0 | Status: SHIPPED | OUTPATIENT
Start: 2024-10-25

## 2024-10-25 NOTE — PROGRESS NOTES
Location of Pain: lower back, both feet    Date Pain Began: 5 years ago          Work Related:   No        Receiving Work Comp/Disability:   No    Numeric Rating Scale:  Pain at Present:  7                                                                                                            (No Pain) 0  to  10 (Worst Pain)                 Minimum Pain:   5  Maximum Pain  10    Distribution of Pain:    bilateral    Quality of Pain:    shooting, stabbing, aching    Origin of Pain:    Disease related    Aggravating Factors:    Other Bending, Lifting    Past Treatments for Current Pain Condition:   PT MDP    Prior diagnostic testing for your pain:  MRI XRay

## 2024-10-25 NOTE — H&P
Name: Sarita Ren   : 5/3/1965   DOS: 10/25/2024     Chief complaint: Lumbar stenosis    History of present illness:  Sarita Ren is a 59 year old female with a history of hypertension who presents today for evaluation of her low back pain with claudication symptoms.  Patient complains of bilateral back pain with a shooting and stabbing pain down the leg.  This is made worse by standing and walking.  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  Currently, on a short leave of absence which has helped with her pain.  She also takes care of her disabled  at home.  Currently experiencing some significant financial stresses due to recent medical cost.  Rates her pain as 8 out of 10.  She denies any chills, fever or weakness. She denies any bladder or bowel incontinence.      Past Medical History:    Cephalgia    viral    Chronic diarrhea    Complex ovarian cyst    Left    Costochondral chest pain    R tenderness    Lower back pain    Left-constant    Recurrent sinusitis      Current Outpatient Medications   Medication Sig Dispense Refill    Meloxicam 15 MG Oral Tab Take 1 tablet (15 mg total) by mouth daily. 30 tablet 0    methylPREDNISolone (MEDROL) 4 MG Oral Tablet Therapy Pack Take as directed 1 each 0    carvedilol 6.25 MG Oral Tab Take 1 tablet (6.25 mg total) by mouth 2 (two) times daily with meals. 180 tablet 0    traMADol 50 MG Oral Tab Take 1 tablet (50 mg total) by mouth every 6 (six) hours as needed for Pain. 20 tablet 0    gabapentin 100 MG Oral Cap Take 1 capsule (100 mg total) by mouth 3 (three) times daily. 60 capsule 0    HYDROcodone-acetaminophen 5-325 MG Oral Tab Take 1 tablet by mouth every 4 (four) hours as needed for Pain.      dilTIAZem HCl ER Beads (TIADYLT ER) 300 MG Oral Capsule SR 24 Hr Take 1 capsule (300 mg total) by mouth daily. 90 capsule 0    MULTIVITAMIN ADULT Oral Tab Take 1 tablet by mouth  daily.      COLLAGEN OR Take 1 Dose by mouth daily.      gabapentin 300 MG Oral Cap Take 1 capsule (300 mg total) by mouth nightly as needed. 90 capsule 0    POTASSIUM OR Take 1 tablet by mouth daily.      aspirin 81 MG Oral Tab Take 1 tablet (81 mg total) by mouth daily.      Aspirin-Acetaminophen-Caffeine (EXCEDRIN OR) Take 1-2 tablets by mouth as needed.      Glucosamine HCl 1000 MG Oral Tab Take 1-2 capsules by mouth daily.      dilTIAZem ER (TIADYLT ER) 360 MG Oral Capsule SR 24 Hr Take 1 capsule (360 mg total) by mouth daily. (Patient not taking: Reported on 10/25/2024) 90 capsule 3     Past Surgical History:   Procedure Laterality Date    Appendectomy      Appendectomy      Other surgical history      elective       Family History   Problem Relation Age of Onset    Kidney Disease Mother     Heart Disease Mother     Dementia Mother     Hypertension Father     Dementia Father     Other (Other) Father      Social History     Socioeconomic History    Marital status:    Occupational History    Occupation: teacher   Tobacco Use    Smoking status: Former     Current packs/day: 0.50     Average packs/day: 0.5 packs/day for 20.0 years (10.0 ttl pk-yrs)     Types: Cigarettes     Passive exposure: Never    Smokeless tobacco: Never   Vaping Use    Vaping status: Never Used   Substance and Sexual Activity    Alcohol use: Yes     Alcohol/week: 6.0 standard drinks of alcohol     Types: 6 Glasses of wine per week    Drug use: No   Other Topics Concern    Caffeine Concern No    Hobby Hazards No    Sleep Concern Yes    Stress Concern Yes    Weight Concern No    Special Diet No    Back Care Yes    Exercise No    Seat Belt Yes    Self-Exams Yes       Review of  other systems:  10 point ROS otherwise negative  Physical examination: Sarita is a 59 year old female not in acute distress  /84 (BP Location: Left arm, Patient Position: Sitting)   Pulse 66   LMP 2013   SpO2 97%    The patient is awake,  alert, oriented and corporative. She has a normal affect. The patient ambulates with normal gait.  HEENT: No gross lesion noted. PEERL. No icterus.  Neck and Upper Extremity: Supple. No thyromegaly or lymphadenopathy.   Motor Examination:    (R)   (L)  Deltoid:      5    5  Biceps:       5    5  Triceps:      5    5  Wrist Extension:     5    5  Wrist Flexsion:     5    5  Finger Extension:     5    5  Finger Flexsion:     5    5     Cardiovascular system: Regular rate and rhythm.    Respiratory system: Breath sounds equal bilaterally.    Abdomen: Soft, nontender,   Neurologic:  Cranial nerves II through XII are grossly intact.       Examination of the lower back:    Motor Examination:   (R)   (L)   Hip Abduction:   5    5   Hip Flexion:    5    5   Knee Extension:   5    5   Knee Flexion:    5    5   Ant. Tibialis:    5    5  Extensor Hallucis Longus:   5    5  Peroneals:     5    5  Gastrocsoleus:     5    5       Radiology diagnostic studies:   Lumbar MRI reviewed independently with the patient.  Evidence of advanced degenerative changes with severe central canal stenosis L4-5    Assessment:  Encounter Diagnosis   Name Primary?    Spinal stenosis of lumbar region with neurogenic claudication Yes   .      Plan:     The patient is a very pleasant 59-year-old female with a history of lumbar degenerative changes worst at L4-5 with severe central canal stenosis.  The patient complains of low back pain with neurogenic claudication symptoms.  This is consistent with her imaging findings.  Discussed treatment options including lumbar epidural steroid injection.  Also reviewed her notes from Ortho spine consultation where lumbar fusion was discussed.  I agree that surgery would lead to permanent symptom resolution.  Patient however, he is unable to consider that at this time due to financial and personal constraints.  Did offer lumbar epidural steroid injection but this may be financially stressful for the patient at this  time.  Sent Medrol Dosepak and nonsteroidal.  Patient to follow-up as needed basis.        Peter Overton MD MPH  Pain Management

## 2024-10-25 NOTE — PATIENT INSTRUCTIONS
Refill policies:    Allow 2-3 business days for refills; controlled substances may take longer.  Contact your pharmacy at least 5 days prior to running out of medication and have them send an electronic request or submit request through the “request refill” option in your [employer] account.  Refills are not addressed on weekends; covering physicians do not authorize routine medications on weekends.  No narcotics or controlled substances are refilled after noon on Fridays or by on call physicians.  By law, narcotics must be electronically prescribed.  A 30 day supply with no refills is the maximum allowed.  If your prescription is due for a refill, you may be due for a follow up appointment.  To best provide you care, patients receiving routine medications need to be seen at least once a year.  Patients receiving narcotic/controlled substance medications need to be seen at least once every 3 months.  In the event that your preferred pharmacy does not have the requested medication in stock (e.g. Backordered), it is your responsibility to find another pharmacy that has the requested medication available.  We will gladly send a new prescription to that pharmacy at your request.    Scheduling Tests:    If your physician has ordered radiology tests such as MRI or CT scans, please contact Central Scheduling at [phone] right away to schedule the test.  Once scheduled, the [facility] Centralized Referral Team will work with your insurance carrier to obtain pre-certification or prior authorization.  Depending on your insurance carrier, approval may take 3-10 days.  It is highly recommended patients assure they have received an authorization before having a test performed.  If test is done without insurance authorization, patient may be responsible for the entire amount billed.      Precertification and Prior Authorizations:  If your physician has recommended that you have a procedure or additional testing performed the [facility]  Centralized Referral Team will contact your insurance carrier to obtain pre-certification or prior authorization.    You are strongly encouraged to contact your insurance carrier to verify that your procedure/test has been approved and is a COVERED benefit.  Although the [facility] Centralized Referral Team does its due diligence, the insurance carrier gives the disclaimer that \"Although the procedure is authorized, this does not guarantee payment.\"    Ultimately the patient is responsible for payment.   Thank you for your understanding in this matter.  Paperwork Completion:  If you require FMLA or disability paperwork for your recovery, please make sure to either drop it off or have it faxed to our office at [phone]. Be sure the form has your name and date of birth on it.  The form will be faxed to our Forms Department and they will complete it for you.  There is a 25$ fee for all forms that need to be filled out.  Please be aware there is a 10-14 day turnaround time.  You will need to sign a release of information (GEOFFREY) form if your paperwork does not come with one.  You may call the Forms Department with any questions at [phone].  Their fax number is [phone].

## 2024-12-16 DIAGNOSIS — G89.29 CHRONIC BILATERAL LOW BACK PAIN WITH RIGHT-SIDED SCIATICA: ICD-10-CM

## 2024-12-16 DIAGNOSIS — M54.41 CHRONIC BILATERAL LOW BACK PAIN WITH RIGHT-SIDED SCIATICA: ICD-10-CM

## 2024-12-17 DIAGNOSIS — I10 ESSENTIAL HYPERTENSION: ICD-10-CM

## 2024-12-17 RX ORDER — GABAPENTIN 100 MG/1
100 CAPSULE ORAL 3 TIMES DAILY
Qty: 60 CAPSULE | Refills: 0 | Status: SHIPPED | OUTPATIENT
Start: 2024-12-17 | End: 2025-01-08

## 2024-12-17 NOTE — TELEPHONE ENCOUNTER
Gabapentin 100 mg    DOS: n/a  Last OV: 9/16/24  Last refill date: 9/16/24     #/refills: 60/0  Upcoming appt: No future appointments.

## 2024-12-18 RX ORDER — GABAPENTIN 100 MG/1
100 CAPSULE ORAL 3 TIMES DAILY
Qty: 60 CAPSULE | Refills: 0 | OUTPATIENT
Start: 2024-12-18

## 2024-12-18 RX ORDER — MELOXICAM 15 MG/1
15 TABLET ORAL DAILY
Qty: 30 TABLET | Refills: 0 | Status: SHIPPED | OUTPATIENT
Start: 2024-12-18

## 2024-12-18 RX ORDER — GABAPENTIN 300 MG/1
300 CAPSULE ORAL NIGHTLY PRN
Qty: 90 CAPSULE | Refills: 0 | OUTPATIENT
Start: 2024-12-18

## 2024-12-18 NOTE — TELEPHONE ENCOUNTER
Medication:   Meloxicam 15 MG Oral Tab     Date of last refill: 10/25/24    Last office visit: 10/25/24  Due back to clinic per last office note:  prn  Date next office visit scheduled:  None      Last OV note recommendation: Plan:      The patient is a very pleasant 59-year-old female with a history of lumbar degenerative changes worst at L4-5 with severe central canal stenosis.  The patient complains of low back pain with neurogenic claudication symptoms.  This is consistent with her imaging findings.  Discussed treatment options including lumbar epidural steroid injection.  Also reviewed her notes from Ortho spine consultation where lumbar fusion was discussed.  I agree that surgery would lead to permanent symptom resolution.  Patient however, he is unable to consider that at this time due to financial and personal constraints.  Did offer lumbar epidural steroid injection but this may be financially stressful for the patient at this time.  Sent Medrol Dosepak and nonsteroidal.  Patient to follow-up as needed basis.           Peter Overton MD MPH  Pain Management

## 2024-12-20 RX ORDER — CARVEDILOL 6.25 MG/1
6.25 TABLET ORAL 2 TIMES DAILY WITH MEALS
Qty: 180 TABLET | Refills: 1 | Status: SHIPPED | OUTPATIENT
Start: 2024-12-20

## 2024-12-20 NOTE — TELEPHONE ENCOUNTER
Hypertension Medications Protocol [id number]/17/2024 06:00 PM   Protocol Details CMP or BMP in past 12 months    Last BP reading less than 140/90    In person appointment or virtual visit in the past 12 mos or appointment in next 3 mos    EGFRCR or GFRNAA > 50      3. Essential hypertension  -BP stable in office  -Continue current meds. She will switch to diltiazem 360 mg once she is out of the 300 mg capsules  -Get new BP machine and can come with nurse visit to get it calibrated    No future appointments.  Return in about 1 year (around 10/9/2025) for physical.

## 2025-01-07 NOTE — TELEPHONE ENCOUNTER
Gabapentin 100 mg  DOS: n/a  Last OV: 9/16/24  Last refill date: 12/17/24     #/refills: 60/0  Upcoming appt: No future appointments.

## 2025-01-08 RX ORDER — GABAPENTIN 100 MG/1
100 CAPSULE ORAL 3 TIMES DAILY
Qty: 60 CAPSULE | Refills: 0 | Status: SHIPPED | OUTPATIENT
Start: 2025-01-08

## 2025-01-08 RX ORDER — MELOXICAM 15 MG/1
15 TABLET ORAL DAILY
Qty: 30 TABLET | Refills: 0 | Status: SHIPPED | OUTPATIENT
Start: 2025-01-08

## 2025-01-08 NOTE — TELEPHONE ENCOUNTER
Medication:   MELOXICAM 15 MG Oral Tab     Date of last refill: 12/19/24    Last office visit: 10/25/24  Due back to clinic per last office note:  prn  Date next office visit scheduled:  None    Last OV note recommendation: The patient is a very pleasant 59-year-old female with a history of lumbar degenerative changes worst at L4-5 with severe central canal stenosis.  The patient complains of low back pain with neurogenic claudication symptoms.  This is consistent with her imaging findings.  Discussed treatment options including lumbar epidural steroid injection.  Also reviewed her notes from Ortho spine consultation where lumbar fusion was discussed.  I agree that surgery would lead to permanent symptom resolution.  Patient however, he is unable to consider that at this time due to financial and personal constraints.  Did offer lumbar epidural steroid injection but this may be financially stressful for the patient at this time.  Sent Medrol Dosepak and nonsteroidal.  Patient to follow-up as needed basis.

## 2025-01-08 NOTE — TELEPHONE ENCOUNTER
Mychart msg sent to pt to advise next refill would need to come from PCP or pain clinic, per Dr Darnell.   To monitor mychart msg is reviewed by pt.

## 2025-01-09 NOTE — TELEPHONE ENCOUNTER
1st attempt to reach the patient.  No answer at this time. Message left on identifying voicemail per release.

## 2025-01-10 NOTE — TELEPHONE ENCOUNTER
2nd attempt to reach the patient. No answer at this time.  Message left on identifying voicemail per release.

## 2025-01-13 NOTE — TELEPHONE ENCOUNTER
3rd attempt to reach the patient. No answer at this time. Detailed message left on identifying voicemail per release.

## 2025-02-13 ENCOUNTER — TELEPHONE (OUTPATIENT)
Dept: INTERNAL MEDICINE CLINIC | Facility: CLINIC | Age: 60
End: 2025-02-13

## 2025-02-13 NOTE — TELEPHONE ENCOUNTER
[facility] department is updating colorectal screening. This patient had a positive FIT test on 10/20/24 and PER pcp she is to recheck another FIT. Please encourage patient to complete within the next few weeks.  Left message to call back.

## 2025-02-27 ENCOUNTER — OFFICE VISIT (OUTPATIENT)
Dept: INTERNAL MEDICINE CLINIC | Facility: CLINIC | Age: 60
End: 2025-02-27
Payer: COMMERCIAL

## 2025-02-27 VITALS
HEART RATE: 75 BPM | BODY MASS INDEX: 22.13 KG/M2 | HEIGHT: 60.16 IN | OXYGEN SATURATION: 94 % | RESPIRATION RATE: 16 BRPM | TEMPERATURE: 97 F | SYSTOLIC BLOOD PRESSURE: 128 MMHG | DIASTOLIC BLOOD PRESSURE: 82 MMHG | WEIGHT: 114.19 LBS

## 2025-02-27 DIAGNOSIS — J06.9 VIRAL URI WITH COUGH: Primary | ICD-10-CM

## 2025-02-27 LAB
CONTROL LINE PRESENT WITH A CLEAR BACKGROUND (YES/NO): YES YES/NO
COVID19 BINAX NOW ANTIGEN: NOT DETECTED
KIT LOT #: NORMAL NUMERIC
OPERATOR ID: NORMAL
POCT LOT NUMBER: NORMAL
STREP GRP A CUL-SCR: NEGATIVE

## 2025-02-27 PROCEDURE — 99213 OFFICE O/P EST LOW 20 MIN: CPT | Performed by: NURSE PRACTITIONER

## 2025-02-27 PROCEDURE — 87880 STREP A ASSAY W/OPTIC: CPT | Performed by: NURSE PRACTITIONER

## 2025-02-27 RX ORDER — BENZONATATE 200 MG/1
200 CAPSULE ORAL 3 TIMES DAILY PRN
Qty: 30 CAPSULE | Refills: 0 | Status: SHIPPED | OUTPATIENT
Start: 2025-02-27

## 2025-02-27 RX ORDER — FLUTICASONE PROPIONATE 50 MCG
2 SPRAY, SUSPENSION (ML) NASAL DAILY
Qty: 16 G | Refills: 0 | Status: SHIPPED | OUTPATIENT
Start: 2025-02-27

## 2025-02-27 NOTE — PATIENT INSTRUCTIONS
Take the benzonatate perles as needed for cough.    Start the Flonase nasal spray.     Do warm, salt water gargles 2-3 times daily.    You can use Robitussin DM or Mucinex DM as directed on the bottle for cough and chest congestion.     Use Cepacol for sore throat and dry cough as needed.     Use Tylenol as needed for pain or fever.    Stay hydrated.    Go to ER or call 911 if worsening symptoms such as persistent fever >103, difficulty breathing, or chest pain.    Infection prevention:  - Proper hand hygiene is very important          - Wear a mask in public  - Avoid touching your mouth, nose, eyes, and face  - Practice social distancing and staying 6 ft away from other individuals  - Cough into your arm or a tissue  - Avoid contact with others if you have symptoms of an upper or lower respiratory infection  - Avoid contact with others who are ill  - Avoid direct contact with your pets if you are ill  - Disinfect surfaces with appropriate materials  - If your symptoms become severe (shortness of breath with rest or activity, fever, chest congestion, productive cough), go to the ER  - Recommend self-isolation at home if you are sick, wearing a mask if in room with other family members

## 2025-02-27 NOTE — PROGRESS NOTES
CHIEF COMPLAINT:     Chief Complaint   Patient presents with    Viral Syndrome     Going on for 3 days - Headaches, Ear Feels Clogged, Sinus Congestion/Drainage, Sore Throat - 7/10, Cough - Dry/Productive        HPI:   Sarita Ren is a 59 year old female coming in with complaints of URI symptoms that started 3 days ago. Complaints of headaches, clogged ears, runny nose, nasal congestion, sore throat, and cough. Denies fevers, SOB, CP, loss of taste/smell, nausea, vomiting, or diarrhea. No at-home testing done.     Past Medical History:    Cephalgia    viral    Chronic diarrhea    Complex ovarian cyst    Left    Costochondral chest pain    R tenderness    Lower back pain    Left-constant    Recurrent sinusitis      Past Surgical History:   Procedure Laterality Date    Appendectomy      Appendectomy  1984    Other surgical history      elective       Social History:  Social History     Socioeconomic History    Marital status:    Occupational History    Occupation: teacher   Tobacco Use    Smoking status: Former     Current packs/day: 0.50     Average packs/day: 0.5 packs/day for 20.0 years (10.0 ttl pk-yrs)     Types: Cigarettes     Passive exposure: Never    Smokeless tobacco: Never   Vaping Use    Vaping status: Never Used   Substance and Sexual Activity    Alcohol use: Yes     Alcohol/week: 6.0 standard drinks of alcohol     Types: 6 Glasses of wine per week    Drug use: No   Other Topics Concern    Caffeine Concern No    Hobby Hazards No    Sleep Concern Yes    Stress Concern Yes    Weight Concern No    Special Diet No    Back Care Yes    Exercise No    Seat Belt Yes    Self-Exams Yes      Family History:  Family History   Problem Relation Age of Onset    Kidney Disease Mother     Heart Disease Mother     Dementia Mother     Hypertension Father     Dementia Father     Other (Other) Father       Allergies:  Allergies[1]   Current Meds:  Current Outpatient Medications   Medication Sig Dispense  Refill    benzonatate 200 MG Oral Cap Take 1 capsule (200 mg total) by mouth 3 (three) times daily as needed. 30 capsule 0    fluticasone propionate 50 MCG/ACT Nasal Suspension 2 sprays by Each Nare route daily. 16 g 0    MELOXICAM 15 MG Oral Tab TAKE 1 TABLET (15 MG TOTAL) BY MOUTH DAILY. (Patient taking differently: Take 1 tablet (15 mg total) by mouth as needed.) 30 tablet 0    GABAPENTIN 100 MG Oral Cap TAKE 1 CAPSULE (100 MG TOTAL) BY MOUTH THREE TIMES DAILY. (Patient taking differently: Take 1 capsule (100 mg total) by mouth as needed.) 60 capsule 0    carvedilol 6.25 MG Oral Tab TAKE 1 TABLET BY MOUTH TWICE A DAY WITH FOOD (Patient taking differently: Take 1 tablet (6.25 mg total) by mouth daily.) 180 tablet 1    dilTIAZem ER (TIADYLT ER) 360 MG Oral Capsule SR 24 Hr Take 1 capsule (360 mg total) by mouth daily. 90 capsule 3    traMADol 50 MG Oral Tab Take 1 tablet (50 mg total) by mouth every 6 (six) hours as needed for Pain. 20 tablet 0    MULTIVITAMIN ADULT Oral Tab Take 1 tablet by mouth daily.      COLLAGEN OR Take 1 Dose by mouth daily.      POTASSIUM OR Take 1 tablet by mouth daily.      aspirin 81 MG Oral Tab Take 1 tablet (81 mg total) by mouth daily.      Aspirin-Acetaminophen-Caffeine (EXCEDRIN OR) Take 1-2 tablets by mouth as needed.      Glucosamine HCl 1000 MG Oral Tab Take 1-2 capsules by mouth daily.         Counseling given: Not Answered       REVIEW OF SYSTEMS:   See HPI.    EXAM:     /82 (BP Location: Left arm, Patient Position: Sitting, Cuff Size: adult)   Pulse 75   Temp 97.4 °F (36.3 °C) (Temporal)   Resp 16   Ht 5' 0.16\" (1.528 m)   LMP 12/31/2013   SpO2 94%   BMI 21.84 kg/m²   Body mass index is 21.84 kg/m².   Vital signs reviewed. Appears stated age, well groomed, in no acute distress.  Physical Exam:  GENERAL: Patient is alert, awake and oriented, well developed, well nourished.  HEENT: Head: Normocephalic, atraumatic. Eyes: EOMI, PERRLA, conjunctivae clear bilaterally,  no eye discharge. Ears: External normal, TM clear bilaterally. No maxillary or frontal sinus tenderness with palpation.   NECK: Supple, no CLAD  HEART: RRR without murmur.  LUNGS: Clear to auscultation bilaterally, no rales/rhonchi/wheezing.  ABDOMEN: good BS's, no masses, HSM or tenderness  MUSCULOSKELETAL: No obvious joint deformity or swelling.   EXTREMITIES: No edema, no cyanosis, no clubbing  NEURO: Oriented time three.    LABS:      Lab Results   Component Value Date    WBC 5.4 09/27/2024    RBC 3.65 (L) 09/27/2024    HGB 13.6 09/27/2024    HCT 37.6 09/27/2024    .0 (H) 09/27/2024    MCH 37.3 (H) 09/27/2024    MCHC 36.2 09/27/2024    RDW 12.9 09/27/2024    .0 09/27/2024      Lab Results   Component Value Date     (H) 09/27/2024    BUN 7 (L) 09/27/2024    BUNCREA 14.0 06/18/2021    CREATSERUM 0.58 09/27/2024    ANIONGAP 6 09/27/2024    GFRNAA 103 06/18/2021    GFRAA 119 06/18/2021    CA 9.8 09/27/2024    OSMOCALC 269 (L) 09/27/2024    ALKPHO 99 09/27/2024    AST 23 09/27/2024    ALT 16 09/27/2024    BILT 0.6 09/27/2024    TP 7.6 09/27/2024    ALB 4.8 09/27/2024    GLOBULIN 2.8 09/27/2024     (L) 09/27/2024    K 4.5 09/27/2024    CL 99 09/27/2024    CO2 25.0 09/27/2024      Lab Results   Component Value Date    CHOLEST 232 (H) 09/27/2024    TRIG 53 09/27/2024     (H) 09/27/2024     (H) 09/27/2024    VLDL 9 09/27/2024    NONHDLC 122 09/27/2024      Lab Results   Component Value Date    TSH 1.342 09/27/2024      Lab Results   Component Value Date     09/27/2024    A1C 5.3 09/27/2024        IMAGING:     No results found.     ASSESSMENT AND PLAN:   1. Viral URI with cough  -Rapid strep and covid negative  -Start tessalon perles and Flonase nasal spray  -Supportive care discussed  -See patient instructions for education  - benzonatate 200 MG Oral Cap; Take 1 capsule (200 mg total) by mouth 3 (three) times daily as needed.  Dispense: 30 capsule; Refill: 0  - fluticasone  propionate 50 MCG/ACT Nasal Suspension; 2 sprays by Each Nare route daily.  Dispense: 16 g; Refill: 0  - COVID19 BinaxNOW Antigen  - Strep A Assay W/Optic     The patient indicates understanding of these issues and agrees to the plan.  Return if symptoms worsen or fail to improve.    Bhumika Jones, APRN  2/27/2025         [1] No Known Allergies

## 2025-03-11 ENCOUNTER — TELEPHONE (OUTPATIENT)
Dept: INTERNAL MEDICINE CLINIC | Facility: CLINIC | Age: 60
End: 2025-03-11

## 2025-03-11 RX ORDER — AZITHROMYCIN 250 MG/1
TABLET, FILM COATED ORAL
Qty: 6 TABLET | Refills: 0 | Status: SHIPPED | OUTPATIENT
Start: 2025-03-11 | End: 2025-03-16

## 2025-03-11 NOTE — TELEPHONE ENCOUNTER
Patient came in because she is still having issues with a cough. Advised she might need further evaluation but she was wondering if Bhumika could prescribe her a z pack. She says her  was seen for the same issue and was prescribed a z pack and is already better while she is still struggling with symptoms. Please advise thanks!

## 2025-03-11 NOTE — TELEPHONE ENCOUNTER
Rx sent. Verbalizes understanding. Advised to take with food and probiotic. Aware to monitor for allergies/effectiveness and call if symp persist or worsen.

## 2025-04-18 ENCOUNTER — OFFICE VISIT (OUTPATIENT)
Dept: PAIN CLINIC | Facility: CLINIC | Age: 60
End: 2025-04-18
Payer: COMMERCIAL

## 2025-04-18 VITALS — OXYGEN SATURATION: 98 % | SYSTOLIC BLOOD PRESSURE: 112 MMHG | HEART RATE: 46 BPM | DIASTOLIC BLOOD PRESSURE: 68 MMHG

## 2025-04-18 DIAGNOSIS — M48.062 SPINAL STENOSIS OF LUMBAR REGION WITH NEUROGENIC CLAUDICATION: Primary | ICD-10-CM

## 2025-04-18 DIAGNOSIS — M41.50 DEGENERATIVE SCOLIOSIS: ICD-10-CM

## 2025-04-18 PROCEDURE — 99214 OFFICE O/P EST MOD 30 MIN: CPT | Performed by: ANESTHESIOLOGY

## 2025-04-18 RX ORDER — TRAMADOL HYDROCHLORIDE 50 MG/1
50 TABLET ORAL EVERY 6 HOURS PRN
Qty: 20 TABLET | Refills: 0 | Status: SHIPPED | OUTPATIENT
Start: 2025-04-18

## 2025-04-18 RX ORDER — MELOXICAM 15 MG/1
15 TABLET ORAL DAILY
Qty: 30 TABLET | Refills: 3 | Status: SHIPPED | OUTPATIENT
Start: 2025-04-18

## 2025-04-18 RX ORDER — GABAPENTIN 100 MG/1
100 CAPSULE ORAL 3 TIMES DAILY
Qty: 90 CAPSULE | Refills: 3 | Status: SHIPPED | OUTPATIENT
Start: 2025-04-18

## 2025-04-18 NOTE — PROGRESS NOTES
Name: Sarita Ren   : 5/3/1965   DOS: 2025     Pain Clinic Follow Up Visit:     Chief Complaint   Patient presents with    Follow - Up     Follow up back pain/ med management        Sarita Ren is a 59 year old female with known degenerative scoliosis and severe spinal canal stenosis L4-5 here for follow-up.  The patient is not able to financially afford surgery interventions at this time.  Has been managing his symptoms with gabapentin, and meloxicam.  Does use tramadol on rare occasions.    Pt denies any chills, fever, or weakness. There is no bladder or bowel incontinence associated with the pain.    REVIEW OF SYSTEMS:  A ten point review of systems was performed with pertinent positives and negatives in the HPI.    Allergies[1]    Current Medications[2]      EXAM:   /68 (BP Location: Left arm, Patient Position: Sitting, Cuff Size: adult)   Pulse (!) 46   LMP 2013   SpO2 98%   General:  Patient is a(n) 59 year old year old female in no acute distress.  Neurologic:: WNL-Orientation to time, place and person, normal mood & affect, concentration & attention span intact.   Inspection:  Ambulates with well-coordinated, fluid, non-antalgic gait.  Gait is normal.  Neck: Full range of motion   cranial nerves: Grossly intact  Respiratory: Nonlabored  Back: Gait intact  IMAGES:     MRI with severe central canal stenosis L4-5    ASSESSMENT AND PLAN:     1. Spinal stenosis of lumbar region with neurogenic claudication    2. Degenerative scoliosis        The patient is a 59-year-old with a history of superior spinal canal stenosis.  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  Additionally, has a high deductible plan and is financially prohibited from having lumbar epidural steroid injection.  Therefore interesting transition her medical management.  I did refill her gabapentin and meloxicam.  She has 3 refills  each.  Did give her a refill for tramadol as well.  She uses this medication very sparingly based upon [place]- reviewed.  Moving forward, she is welcome to transition her medication management to her PCP.  Gabapentin and meloxicam are not controlled substances.  Her tramadol usage is well below the 50 mEq of morphine daily requirement necessary for pain management involvement.      Pain is  limiting functional status. Failed conservative treatment consisting of medications, activity modification, and  PT.  1.Risks of long term narcotic use d/w pt including dependence, abuse, and death from overdose and mixing narcotic with alcohol. Pt verbalized understanding.     Medications filled today:  Requested Prescriptions     Signed Prescriptions Disp Refills    Meloxicam 15 MG Oral Tab 30 tablet 3     Sig: Take 1 tablet (15 mg total) by mouth daily.    gabapentin 100 MG Oral Cap 90 capsule 3     Sig: Take 1 capsule (100 mg total) by mouth 3 (three) times daily.    traMADol 50 MG Oral Tab 20 tablet 0     Sig: Take 1 tablet (50 mg total) by mouth every 6 (six) hours as needed for Pain.     Orders:No orders of the defined types were placed in this encounter.        Radiology orders and consultations:None  The patient indicates understanding of these issues and agrees to the plan.  No follow-ups on file.    Peter Overton MD, 4/18/2025, 9:31 AM              [1] No Known Allergies  [2]   Current Outpatient Medications   Medication Sig Dispense Refill    Meloxicam 15 MG Oral Tab Take 1 tablet (15 mg total) by mouth daily. 30 tablet 3    gabapentin 100 MG Oral Cap Take 1 capsule (100 mg total) by mouth 3 (three) times daily. 90 capsule 3    traMADol 50 MG Oral Tab Take 1 tablet (50 mg total) by mouth every 6 (six) hours as needed for Pain. 20 tablet 0    fluticasone propionate 50 MCG/ACT Nasal Suspension 2 sprays by Each Nare route daily. 16 g 0    carvedilol 6.25 MG Oral Tab TAKE 1 TABLET BY MOUTH TWICE A DAY WITH FOOD (Patient  taking differently: Take 1 tablet (6.25 mg total) by mouth daily.) 180 tablet 1    dilTIAZem ER (TIADYLT ER) 360 MG Oral Capsule SR 24 Hr Take 1 capsule (360 mg total) by mouth daily. 90 capsule 3    MULTIVITAMIN ADULT Oral Tab Take 1 tablet by mouth daily.      COLLAGEN OR Take 1 Dose by mouth daily.      POTASSIUM OR Take 1 tablet by mouth daily.      aspirin 81 MG Oral Tab Take 1 tablet (81 mg total) by mouth daily.      Aspirin-Acetaminophen-Caffeine (EXCEDRIN OR) Take 1-2 tablets by mouth as needed.      Glucosamine HCl 1000 MG Oral Tab Take 1-2 capsules by mouth daily.

## 2025-04-18 NOTE — PATIENT INSTRUCTIONS
Refill policies:    Allow 2-3 business days for refills; controlled substances may take longer.  Contact your pharmacy at least 5 days prior to running out of medication and have them send an electronic request or submit request through the “request refill” option in your [employer] account.  Refills are not addressed on weekends; covering physicians do not authorize routine medications on weekends.  No narcotics or controlled substances are refilled after noon on Fridays or by on call physicians.  By law, narcotics must be electronically prescribed.  A 30 day supply with no refills is the maximum allowed.  If your prescription is due for a refill, you may be due for a follow up appointment.  To best provide you care, patients receiving routine medications need to be seen at least once a year.  Patients receiving narcotic/controlled substance medications need to be seen at least once every 3 months.  In the event that your preferred pharmacy does not have the requested medication in stock (e.g. Backordered), it is your responsibility to find another pharmacy that has the requested medication available.  We will gladly send a new prescription to that pharmacy at your request.    Scheduling Tests:    If your physician has ordered radiology tests such as MRI or CT scans, please contact Central Scheduling at [phone] right away to schedule the test.  Once scheduled, the [facility] Centralized Referral Team will work with your insurance carrier to obtain pre-certification or prior authorization.  Depending on your insurance carrier, approval may take 3-10 days.  It is highly recommended patients assure they have received an authorization before having a test performed.  If test is done without insurance authorization, patient may be responsible for the entire amount billed.      Precertification and Prior Authorizations:  If your physician has recommended that you have a procedure or additional testing performed the [facility]  Centralized Referral Team will contact your insurance carrier to obtain pre-certification or prior authorization.    You are strongly encouraged to contact your insurance carrier to verify that your procedure/test has been approved and is a COVERED benefit.  Although the [facility] Centralized Referral Team does its due diligence, the insurance carrier gives the disclaimer that \"Although the procedure is authorized, this does not guarantee payment.\"    Ultimately the patient is responsible for payment.   Thank you for your understanding in this matter.  Paperwork Completion:  If you require FMLA or disability paperwork for your recovery, please make sure to either drop it off or have it faxed to our office at [phone]. Be sure the form has your name and date of birth on it.  The form will be faxed to our Forms Department and they will complete it for you.  There is a 25$ fee for all forms that need to be filled out.  Please be aware there is a 10-14 day turnaround time.  You will need to sign a release of information (GEOFFREY) form if your paperwork does not come with one.  You may call the Forms Department with any questions at [phone].  Their fax number is [phone].

## 2025-04-18 NOTE — PROGRESS NOTES
Patient presents in office today with reported pain in neck and back pain radiating down back. Wants to transfer med management from Dr. Darnell to Dr. Overton.     Current pain level reported = 8/10    Last reported dose of None    Narcotic Contract renewal N/A    Urine Drug screen N/A

## 2025-06-18 DIAGNOSIS — I10 ESSENTIAL HYPERTENSION: ICD-10-CM

## 2025-06-19 RX ORDER — DILTIAZEM HYDROCHLORIDE 360 MG/1
360 CAPSULE, EXTENDED RELEASE ORAL DAILY
Qty: 90 CAPSULE | Refills: 0 | Status: SHIPPED | OUTPATIENT
Start: 2025-06-19

## 2025-06-19 NOTE — TELEPHONE ENCOUNTER
Hypertension Medications Protocol Naauzi5106/18/2025 03:20 PM   Protocol Details CMP or BMP in past 12 months    Last BP reading less than 140/90    In person appointment or virtual visit in the past 12 mos or appointment in next 3 mos    EGFRCR or GFRNAA > 50    Medication is active on med list       Essential hypertension  -BP stable in office  -Continue current meds. She will switch to diltiazem 360 mg once she is out of the 300 mg capsules  -Get new BP machine and can come with nurse visit to get it calibrated  No future appointments.

## 2025-07-06 DIAGNOSIS — I10 ESSENTIAL HYPERTENSION: ICD-10-CM

## 2025-07-07 RX ORDER — CARVEDILOL 6.25 MG/1
6.25 TABLET ORAL 2 TIMES DAILY WITH MEALS
Qty: 180 TABLET | Refills: 0 | Status: SHIPPED | OUTPATIENT
Start: 2025-07-07

## 2025-07-07 NOTE — TELEPHONE ENCOUNTER
Hypertension Medications Protocol Gfbyxi5707/06/2025 06:53 AM   Protocol Details CMP or BMP in past 12 months    Last BP reading less than 140/90    In person appointment or virtual visit in the past 12 mos or appointment in next 3 mos    EGFRCR or GFRNAA > 50    Medication is active on med list   3. Essential hypertension  -BP stable in office  -Continue current meds. She will switch to diltiazem 360 mg once she is out of the 300 mg capsules  -Get new BP machine and can come with nurse visit to get it calibrated  - DME - External  - carvedilol 6.25 MG Oral Tab; Take 1 tablet (6.25 mg total) by mouth 2 (two) times daily with meals.  Dispense: 180 tablet; Refill: 0   No future appointments.

## 2025-08-12 RX ORDER — GABAPENTIN 100 MG/1
100 CAPSULE ORAL 3 TIMES DAILY
Qty: 90 CAPSULE | Refills: 3 | Status: SHIPPED | OUTPATIENT
Start: 2025-08-12

## 2025-08-12 RX ORDER — MELOXICAM 15 MG/1
15 TABLET ORAL DAILY
Qty: 30 TABLET | Refills: 3 | Status: SHIPPED | OUTPATIENT
Start: 2025-08-12

## (undated) DIAGNOSIS — I10 ESSENTIAL HYPERTENSION: ICD-10-CM

## (undated) NOTE — LETTER
[facility], [address] [facility], [place] [address] [address]  PH: [phone]  FAX: [phone]       Date:  9/10/2024       Patient:  Sarita Ren   5/3/1965        To Whom it may concern:    This letter has been written at the patient's request. The above patient was seen at the [facility] for treatment of a medical condition.    This patient should be excused from attending work till 10/13/24.    Patient ok to return to work on 10/14/24        Sincerely,      Bhumika Jones NP

## (undated) NOTE — LETTER
06/27/22        [address]  [facility], [address] [address] [address]      Dear Mila,    This is a friendly reminder to complete your Colon Cancer Screening. To complete this lab, please follow the instruction in the kit provided to your during your office visit. Occult Blood, Fecal, Immunoassay (Blue cards)     To provide you with the best possible care, please complete these orders at your earliest convenience. If you have recently completed these orders please disregard this letter. If you have any questions please call the office at Dept: [phone].      Thank you,       Dr. Hollie Wong MD

## (undated) NOTE — LETTER
08/30/21        [address]  [facility], [address] [address]      Dear Courtney Vizcarra,    This is a friendly reminder to let you know it's time to schedule your Annual Mammogram.  To schedule your appointment, please call Central Scheduling

## (undated) NOTE — LETTER
Date: 9/19/2022    Patient Name: Kacy Cadet 5/3/1965          To Whom it may concern: This letter has been written at the patient's request. The above patient was seen at the [facility] for treatment of a medical condition. This patient should be excused from attending work from 9/18/22 through 9/21/22. The patient may return to work on 9/22/22 with the following limitations: unable to work Friday 9/23/22 due to lab appointment.          Sincerely,    NESSA Burr

## (undated) NOTE — LETTER
Date: 9/19/2022    Patient Name: Goldy Urbano        To Whom it may concern: This letter has been written at the patient's request. The above patient was seen at the [facility] for treatment of a medical condition. This patient should be excused from attending work from 9/18/22 through 9/21/22. The patient may return to work on 9/22/22 with the following limitations: light duty for 1 week - restricted bending and lifting; unable to work Friday 9/23/22 due to lab appointment.          Sincerely,    NESSA Herrera

## (undated) NOTE — LETTER
Date: 8/28/2024    Patient Name: Sarita Ren 5/3/1965            To Whom it may concern:    This letter has been written at the patient's request. The above patient was seen at [facility] for treatment of a medical condition.    This patient should be excused from attending work from 9/1/24 through 9/14/24.    The patient may return to work on 9/15/24.         Sincerely,      NESSA Quick